# Patient Record
Sex: FEMALE | Race: WHITE | Employment: OTHER | URBAN - METROPOLITAN AREA
[De-identification: names, ages, dates, MRNs, and addresses within clinical notes are randomized per-mention and may not be internally consistent; named-entity substitution may affect disease eponyms.]

---

## 2018-10-17 ENCOUNTER — APPOINTMENT (OUTPATIENT)
Dept: CT IMAGING | Age: 78
DRG: 070 | End: 2018-10-17
Payer: MEDICARE

## 2018-10-17 ENCOUNTER — APPOINTMENT (OUTPATIENT)
Dept: GENERAL RADIOLOGY | Age: 78
DRG: 070 | End: 2018-10-17
Payer: MEDICARE

## 2018-10-17 ENCOUNTER — HOSPITAL ENCOUNTER (INPATIENT)
Age: 78
LOS: 5 days | Discharge: HOME HEALTH | DRG: 070 | End: 2018-10-22
Attending: EMERGENCY MEDICINE | Admitting: INTERNAL MEDICINE
Payer: MEDICARE

## 2018-10-17 DIAGNOSIS — A41.9 SEPSIS SECONDARY TO UTI (HCC): ICD-10-CM

## 2018-10-17 DIAGNOSIS — N39.0 SEPSIS SECONDARY TO UTI (HCC): ICD-10-CM

## 2018-10-17 DIAGNOSIS — J96.01 ACUTE RESPIRATORY FAILURE WITH HYPOXIA (HCC): Primary | ICD-10-CM

## 2018-10-17 DIAGNOSIS — E08.10 DIABETIC KETOACIDOSIS WITHOUT COMA ASSOCIATED WITH DIABETES MELLITUS DUE TO UNDERLYING CONDITION (HCC): ICD-10-CM

## 2018-10-17 PROBLEM — E13.10 SECONDARY DM WITH DKA, UNCONTROLLED (HCC): Status: RESOLVED | Noted: 2018-10-17 | Resolved: 2018-10-17

## 2018-10-17 PROBLEM — E11.10 DKA (DIABETIC KETOACIDOSIS) (HCC): Status: ACTIVE | Noted: 2018-10-17

## 2018-10-17 PROBLEM — E66.01 MORBID OBESITY WITH BMI OF 60.0-69.9, ADULT (HCC): Chronic | Status: ACTIVE | Noted: 2018-10-17

## 2018-10-17 PROBLEM — E13.10 SECONDARY DM WITH DKA, UNCONTROLLED (HCC): Status: ACTIVE | Noted: 2018-10-17

## 2018-10-17 PROBLEM — G93.41 METABOLIC ENCEPHALOPATHY: Status: ACTIVE | Noted: 2018-10-17

## 2018-10-17 LAB
ALBUMIN SERPL-MCNC: 3.8 G/DL (ref 3.5–5.2)
ALP BLD-CCNC: 77 U/L (ref 35–104)
ALT SERPL-CCNC: 24 U/L (ref 0–32)
AMMONIA: 27 UMOL/L (ref 11–51)
AMORPHOUS: ABNORMAL
ANION GAP SERPL CALCULATED.3IONS-SCNC: 15 MMOL/L (ref 7–16)
ANION GAP SERPL CALCULATED.3IONS-SCNC: 22 MMOL/L (ref 7–16)
AST SERPL-CCNC: 36 U/L (ref 0–31)
BACTERIA: ABNORMAL /HPF
BETA-HYDROXYBUTYRATE: 0.51 MMOL/L (ref 0.02–0.27)
BILIRUB SERPL-MCNC: 0.2 MG/DL (ref 0–1.2)
BILIRUBIN URINE: NEGATIVE
BLOOD, URINE: ABNORMAL
BUN BLDV-MCNC: 37 MG/DL (ref 8–23)
BUN BLDV-MCNC: 42 MG/DL (ref 8–23)
CALCIUM SERPL-MCNC: 9.2 MG/DL (ref 8.6–10.2)
CALCIUM SERPL-MCNC: 9.7 MG/DL (ref 8.6–10.2)
CHLORIDE BLD-SCNC: 93 MMOL/L (ref 98–107)
CHLORIDE BLD-SCNC: 94 MMOL/L (ref 98–107)
CHP ED QC CHECK: YES
CLARITY: ABNORMAL
CO2: 24 MMOL/L (ref 22–29)
CO2: 29 MMOL/L (ref 22–29)
COLOR: YELLOW
CREAT SERPL-MCNC: 1.5 MG/DL (ref 0.5–1)
CREAT SERPL-MCNC: 1.6 MG/DL (ref 0.5–1)
EKG ATRIAL RATE: 105 BPM
EKG P AXIS: 49 DEGREES
EKG P-R INTERVAL: 202 MS
EKG Q-T INTERVAL: 352 MS
EKG QRS DURATION: 114 MS
EKG QTC CALCULATION (BAZETT): 465 MS
EKG R AXIS: -63 DEGREES
EKG T AXIS: 106 DEGREES
EKG VENTRICULAR RATE: 105 BPM
GFR AFRICAN AMERICAN: 31
GFR AFRICAN AMERICAN: 31
GFR AFRICAN AMERICAN: 38
GFR AFRICAN AMERICAN: 41
GFR NON-AFRICAN AMERICAN: 26 ML/MIN/1.73
GFR NON-AFRICAN AMERICAN: 26 ML/MIN/1.73
GFR NON-AFRICAN AMERICAN: 31 ML/MIN/1.73
GFR NON-AFRICAN AMERICAN: 34 ML/MIN/1.73
GLUCOSE BLD-MCNC: 202 MG/DL
GLUCOSE BLD-MCNC: 210 MG/DL (ref 74–109)
GLUCOSE BLD-MCNC: 216 MG/DL (ref 74–109)
GLUCOSE BLD-MCNC: 320 MG/DL (ref 74–109)
GLUCOSE BLD-MCNC: 322 MG/DL (ref 74–109)
GLUCOSE URINE: NEGATIVE MG/DL
HCT VFR BLD CALC: 43.6 % (ref 34–48)
HEMOGLOBIN: 13 G/DL (ref 11.5–15.5)
INR BLD: 3.4
KETONES, URINE: NEGATIVE MG/DL
LACTIC ACID: 2.6 MMOL/L (ref 0.5–2.2)
LACTIC ACID: 6.3 MMOL/L (ref 0.5–2.2)
LEUKOCYTE ESTERASE, URINE: ABNORMAL
MCH RBC QN AUTO: 26.4 PG (ref 26–35)
MCHC RBC AUTO-ENTMCNC: 29.8 % (ref 32–34.5)
MCV RBC AUTO: 88.4 FL (ref 80–99.9)
METER GLUCOSE: 202 MG/DL (ref 70–110)
NITRITE, URINE: NEGATIVE
PDW BLD-RTO: 15.1 FL (ref 11.5–15)
PH UA: >=9 (ref 5–9)
PH VENOUS: 7.21 (ref 7.3–7.42)
PLATELET # BLD: 347 E9/L (ref 130–450)
PMV BLD AUTO: 10.5 FL (ref 7–12)
POC CHLORIDE: 99 MMOL/L (ref 100–108)
POC CHLORIDE: 99 MMOL/L (ref 100–108)
POC CREATININE: 1.9 MG/DL (ref 0.5–1)
POC CREATININE: 1.9 MG/DL (ref 0.5–1)
POC POTASSIUM: 3.3 MMOL/L (ref 3.5–5)
POC POTASSIUM: 3.4 MMOL/L (ref 3.5–5)
POC SODIUM: 138 MMOL/L (ref 132–146)
POC SODIUM: 138 MMOL/L (ref 132–146)
POTASSIUM SERPL-SCNC: 3.9 MMOL/L (ref 3.5–5)
POTASSIUM SERPL-SCNC: 4.4 MMOL/L (ref 3.5–5)
PROTEIN UA: 100 MG/DL
PROTHROMBIN TIME: 37.6 SEC (ref 9.3–12.4)
RBC # BLD: 4.93 E12/L (ref 3.5–5.5)
RBC UA: ABNORMAL /HPF (ref 0–2)
SODIUM BLD-SCNC: 138 MMOL/L (ref 132–146)
SODIUM BLD-SCNC: 139 MMOL/L (ref 132–146)
SPECIFIC GRAVITY UA: 1.01 (ref 1–1.03)
TOTAL CK: 314 U/L (ref 20–180)
TOTAL PROTEIN: 7.1 G/DL (ref 6.4–8.3)
TROPONIN: 0.02 NG/ML (ref 0–0.03)
UROBILINOGEN, URINE: 0.2 E.U./DL
WBC # BLD: 22.4 E9/L (ref 4.5–11.5)
WBC UA: ABNORMAL /HPF (ref 0–5)

## 2018-10-17 PROCEDURE — 87088 URINE BACTERIA CULTURE: CPT

## 2018-10-17 PROCEDURE — 84132 ASSAY OF SERUM POTASSIUM: CPT

## 2018-10-17 PROCEDURE — 82140 ASSAY OF AMMONIA: CPT

## 2018-10-17 PROCEDURE — 82800 BLOOD PH: CPT

## 2018-10-17 PROCEDURE — 96365 THER/PROPH/DIAG IV INF INIT: CPT

## 2018-10-17 PROCEDURE — 84484 ASSAY OF TROPONIN QUANT: CPT

## 2018-10-17 PROCEDURE — 83605 ASSAY OF LACTIC ACID: CPT

## 2018-10-17 PROCEDURE — 82947 ASSAY GLUCOSE BLOOD QUANT: CPT

## 2018-10-17 PROCEDURE — 71045 X-RAY EXAM CHEST 1 VIEW: CPT

## 2018-10-17 PROCEDURE — 93005 ELECTROCARDIOGRAM TRACING: CPT | Performed by: EMERGENCY MEDICINE

## 2018-10-17 PROCEDURE — 36415 COLL VENOUS BLD VENIPUNCTURE: CPT

## 2018-10-17 PROCEDURE — 2580000003 HC RX 258: Performed by: EMERGENCY MEDICINE

## 2018-10-17 PROCEDURE — 82435 ASSAY OF BLOOD CHLORIDE: CPT

## 2018-10-17 PROCEDURE — 82962 GLUCOSE BLOOD TEST: CPT

## 2018-10-17 PROCEDURE — 82010 KETONE BODYS QUAN: CPT

## 2018-10-17 PROCEDURE — 84295 ASSAY OF SERUM SODIUM: CPT

## 2018-10-17 PROCEDURE — 70450 CT HEAD/BRAIN W/O DYE: CPT

## 2018-10-17 PROCEDURE — 85610 PROTHROMBIN TIME: CPT

## 2018-10-17 PROCEDURE — 96367 TX/PROPH/DG ADDL SEQ IV INF: CPT

## 2018-10-17 PROCEDURE — 80053 COMPREHEN METABOLIC PANEL: CPT

## 2018-10-17 PROCEDURE — 80048 BASIC METABOLIC PNL TOTAL CA: CPT

## 2018-10-17 PROCEDURE — 82565 ASSAY OF CREATININE: CPT

## 2018-10-17 PROCEDURE — 99285 EMERGENCY DEPT VISIT HI MDM: CPT

## 2018-10-17 PROCEDURE — 6360000002 HC RX W HCPCS: Performed by: EMERGENCY MEDICINE

## 2018-10-17 PROCEDURE — 81001 URINALYSIS AUTO W/SCOPE: CPT

## 2018-10-17 PROCEDURE — 87040 BLOOD CULTURE FOR BACTERIA: CPT

## 2018-10-17 PROCEDURE — 2000000000 HC ICU R&B

## 2018-10-17 PROCEDURE — 85027 COMPLETE CBC AUTOMATED: CPT

## 2018-10-17 PROCEDURE — 82550 ASSAY OF CK (CPK): CPT

## 2018-10-17 RX ORDER — DEXTROSE MONOHYDRATE 25 G/50ML
12.5 INJECTION, SOLUTION INTRAVENOUS PRN
Status: DISCONTINUED | OUTPATIENT
Start: 2018-10-17 | End: 2018-10-22 | Stop reason: HOSPADM

## 2018-10-17 RX ORDER — DEXTROSE MONOHYDRATE 50 MG/ML
100 INJECTION, SOLUTION INTRAVENOUS PRN
Status: DISCONTINUED | OUTPATIENT
Start: 2018-10-17 | End: 2018-10-18

## 2018-10-17 RX ORDER — NICOTINE POLACRILEX 4 MG
15 LOZENGE BUCCAL PRN
Status: DISCONTINUED | OUTPATIENT
Start: 2018-10-17 | End: 2018-10-22 | Stop reason: HOSPADM

## 2018-10-17 RX ORDER — 0.9 % SODIUM CHLORIDE 0.9 %
1000 INTRAVENOUS SOLUTION INTRAVENOUS ONCE
Status: COMPLETED | OUTPATIENT
Start: 2018-10-17 | End: 2018-10-18

## 2018-10-17 RX ADMIN — SODIUM CHLORIDE 1000 ML: 9 INJECTION, SOLUTION INTRAVENOUS at 21:36

## 2018-10-17 RX ADMIN — CEFEPIME HYDROCHLORIDE 2 G: 2 INJECTION, POWDER, FOR SOLUTION INTRAVENOUS at 21:37

## 2018-10-17 RX ADMIN — VANCOMYCIN HYDROCHLORIDE 2500 MG: 1 INJECTION, POWDER, LYOPHILIZED, FOR SOLUTION INTRAVENOUS at 22:25

## 2018-10-18 ENCOUNTER — APPOINTMENT (OUTPATIENT)
Dept: GENERAL RADIOLOGY | Age: 78
DRG: 070 | End: 2018-10-18
Payer: MEDICARE

## 2018-10-18 PROBLEM — J96.00 ACUTE RESPIRATORY FAILURE (HCC): Status: ACTIVE | Noted: 2018-10-17

## 2018-10-18 LAB
ALBUMIN SERPL-MCNC: 3.5 G/DL (ref 3.5–5.2)
ALP BLD-CCNC: 76 U/L (ref 35–104)
ALT SERPL-CCNC: 35 U/L (ref 0–32)
ANION GAP SERPL CALCULATED.3IONS-SCNC: 14 MMOL/L (ref 7–16)
ANION GAP SERPL CALCULATED.3IONS-SCNC: 14 MMOL/L (ref 7–16)
AST SERPL-CCNC: 52 U/L (ref 0–31)
B.E.: 1.7 MMOL/L (ref -3–3)
B.E.: 2.3 MMOL/L (ref -3–3)
BASOPHILS ABSOLUTE: 0.03 E9/L (ref 0–0.2)
BASOPHILS RELATIVE PERCENT: 0.2 % (ref 0–2)
BILIRUB SERPL-MCNC: 0.2 MG/DL (ref 0–1.2)
BUN BLDV-MCNC: 45 MG/DL (ref 8–23)
BUN BLDV-MCNC: 50 MG/DL (ref 8–23)
CALCIUM SERPL-MCNC: 8.9 MG/DL (ref 8.6–10.2)
CALCIUM SERPL-MCNC: 8.9 MG/DL (ref 8.6–10.2)
CHLORIDE BLD-SCNC: 94 MMOL/L (ref 98–107)
CHLORIDE BLD-SCNC: 95 MMOL/L (ref 98–107)
CK MB: 7.3 NG/ML (ref 0–4.3)
CK MB: 9.3 NG/ML (ref 0–4.3)
CO2: 26 MMOL/L (ref 22–29)
CO2: 30 MMOL/L (ref 22–29)
COHB: 0.6 % (ref 0–1.5)
COHB: 0.6 % (ref 0–1.5)
CREAT SERPL-MCNC: 1.7 MG/DL (ref 0.5–1)
CREAT SERPL-MCNC: 1.8 MG/DL (ref 0.5–1)
CRITICAL: ABNORMAL
CRITICAL: ABNORMAL
DATE ANALYZED: ABNORMAL
DATE ANALYZED: ABNORMAL
DATE OF COLLECTION: ABNORMAL
DATE OF COLLECTION: ABNORMAL
EOSINOPHILS ABSOLUTE: 0 E9/L (ref 0.05–0.5)
EOSINOPHILS RELATIVE PERCENT: 0 % (ref 0–6)
FILM ARRAY ADENOVIRUS: NORMAL
FILM ARRAY BORDETELLA PERTUSSIS: NORMAL
FILM ARRAY CHLAMYDOPHILIA PNEUMONIAE: NORMAL
FILM ARRAY CORONAVIRUS 229E: NORMAL
FILM ARRAY CORONAVIRUS HKU1: NORMAL
FILM ARRAY CORONAVIRUS NL63: NORMAL
FILM ARRAY CORONAVIRUS OC43: NORMAL
FILM ARRAY INFLUENZA A VIRUS 09H1: NORMAL
FILM ARRAY INFLUENZA A VIRUS H1: NORMAL
FILM ARRAY INFLUENZA A VIRUS H3: NORMAL
FILM ARRAY INFLUENZA A VIRUS: NORMAL
FILM ARRAY INFLUENZA B: NORMAL
FILM ARRAY METAPNEUMOVIRUS: NORMAL
FILM ARRAY MYCOPLASMA PNEUMONIAE: NORMAL
FILM ARRAY PARAINFLUENZA VIRUS 1: NORMAL
FILM ARRAY PARAINFLUENZA VIRUS 2: NORMAL
FILM ARRAY PARAINFLUENZA VIRUS 3: NORMAL
FILM ARRAY PARAINFLUENZA VIRUS 4: NORMAL
FILM ARRAY RESPIRATORY SYNCITIAL VIRUS: NORMAL
FILM ARRAY RHINOVIRUS/ENTEROVIRUS: NORMAL
GFR AFRICAN AMERICAN: 33
GFR AFRICAN AMERICAN: 35
GFR NON-AFRICAN AMERICAN: 27 ML/MIN/1.73
GFR NON-AFRICAN AMERICAN: 29 ML/MIN/1.73
GLUCOSE BLD-MCNC: 192 MG/DL (ref 74–109)
GLUCOSE BLD-MCNC: 217 MG/DL (ref 74–109)
HBA1C MFR BLD: 7 % (ref 4–5.6)
HCO3: 27 MMOL/L (ref 22–26)
HCO3: 29.3 MMOL/L (ref 22–26)
HCT VFR BLD CALC: 41.2 % (ref 34–48)
HEMOGLOBIN: 12.4 G/DL (ref 11.5–15.5)
HHB: 3.3 % (ref 0–5)
HHB: 5.7 % (ref 0–5)
IMMATURE GRANULOCYTES #: 0.1 E9/L
IMMATURE GRANULOCYTES %: 0.5 % (ref 0–5)
INR BLD: 3.5
LAB: 9558
LAB: 9558
LACTIC ACID: 1.4 MMOL/L (ref 0.5–2.2)
LACTIC ACID: 2.6 MMOL/L (ref 0.5–2.2)
LACTIC ACID: 2.8 MMOL/L (ref 0.5–2.2)
LYMPHOCYTES ABSOLUTE: 0.6 E9/L (ref 1.5–4)
LYMPHOCYTES RELATIVE PERCENT: 3.3 % (ref 20–42)
Lab: ABNORMAL
Lab: ABNORMAL
MCH RBC QN AUTO: 26.4 PG (ref 26–35)
MCHC RBC AUTO-ENTMCNC: 30.1 % (ref 32–34.5)
MCV RBC AUTO: 87.7 FL (ref 80–99.9)
METER GLUCOSE: 157 MG/DL (ref 70–110)
METER GLUCOSE: 164 MG/DL (ref 70–110)
METER GLUCOSE: 184 MG/DL (ref 70–110)
METER GLUCOSE: 198 MG/DL (ref 70–110)
METHB: 0 % (ref 0–1.5)
METHB: 0.1 % (ref 0–1.5)
MODE: ABNORMAL
MODE: ABNORMAL
MONOCYTES ABSOLUTE: 1.2 E9/L (ref 0.1–0.95)
MONOCYTES RELATIVE PERCENT: 6.5 % (ref 2–12)
NEUTROPHILS ABSOLUTE: 16.52 E9/L (ref 1.8–7.3)
NEUTROPHILS RELATIVE PERCENT: 89.5 % (ref 43–80)
O2 CONTENT: 16.6 ML/DL
O2 CONTENT: 17.4 ML/DL
O2 SATURATION: 94.3 % (ref 92–98.5)
O2 SATURATION: 96.7 % (ref 92–98.5)
O2HB: 93.7 % (ref 94–97)
O2HB: 96 % (ref 94–97)
OPERATOR ID: 7490
OPERATOR ID: ABNORMAL
PATIENT TEMP: 37 C
PATIENT TEMP: 37 C
PCO2: 45.5 MMHG (ref 35–45)
PCO2: 55.8 MMHG (ref 35–45)
PDW BLD-RTO: 15.3 FL (ref 11.5–15)
PH BLOOD GAS: 7.34 (ref 7.35–7.45)
PH BLOOD GAS: 7.39 (ref 7.35–7.45)
PLATELET # BLD: 285 E9/L (ref 130–450)
PMV BLD AUTO: 10.7 FL (ref 7–12)
PO2: 73.9 MMHG (ref 60–100)
PO2: 86.6 MMHG (ref 60–100)
POTASSIUM SERPL-SCNC: 3.6 MMOL/L (ref 3.5–5)
POTASSIUM SERPL-SCNC: 4.1 MMOL/L (ref 3.5–5)
PRO-BNP: 314 PG/ML (ref 0–450)
PROCALCITONIN: 0.5 NG/ML (ref 0–0.08)
PROTHROMBIN TIME: 39.5 SEC (ref 9.3–12.4)
RBC # BLD: 4.7 E12/L (ref 3.5–5.5)
SODIUM BLD-SCNC: 135 MMOL/L (ref 132–146)
SODIUM BLD-SCNC: 138 MMOL/L (ref 132–146)
SOURCE, BLOOD GAS: ABNORMAL
SOURCE, BLOOD GAS: ABNORMAL
THB: 12.2 G/DL (ref 11.5–16.5)
THB: 13.2 G/DL (ref 11.5–16.5)
TIME ANALYZED: 1932
TIME ANALYZED: 928
TOTAL CK: 1615 U/L (ref 20–180)
TOTAL CK: 1624 U/L (ref 20–180)
TOTAL PROTEIN: 6.3 G/DL (ref 6.4–8.3)
TROPONIN: 0.04 NG/ML (ref 0–0.03)
WBC # BLD: 18.5 E9/L (ref 4.5–11.5)

## 2018-10-18 PROCEDURE — 87450 HC DIRECT STREP B ANTIGEN: CPT

## 2018-10-18 PROCEDURE — 87581 M.PNEUMON DNA AMP PROBE: CPT

## 2018-10-18 PROCEDURE — 6370000000 HC RX 637 (ALT 250 FOR IP): Performed by: INTERNAL MEDICINE

## 2018-10-18 PROCEDURE — 84145 PROCALCITONIN (PCT): CPT

## 2018-10-18 PROCEDURE — 73620 X-RAY EXAM OF FOOT: CPT

## 2018-10-18 PROCEDURE — 80053 COMPREHEN METABOLIC PANEL: CPT

## 2018-10-18 PROCEDURE — 80048 BASIC METABOLIC PNL TOTAL CA: CPT

## 2018-10-18 PROCEDURE — 94762 N-INVAS EAR/PLS OXIMTRY CONT: CPT

## 2018-10-18 PROCEDURE — 87081 CULTURE SCREEN ONLY: CPT

## 2018-10-18 PROCEDURE — 85025 COMPLETE CBC W/AUTO DIFF WBC: CPT

## 2018-10-18 PROCEDURE — 87486 CHLMYD PNEUM DNA AMP PROBE: CPT

## 2018-10-18 PROCEDURE — 87798 DETECT AGENT NOS DNA AMP: CPT

## 2018-10-18 PROCEDURE — 82550 ASSAY OF CK (CPK): CPT

## 2018-10-18 PROCEDURE — 82962 GLUCOSE BLOOD TEST: CPT

## 2018-10-18 PROCEDURE — 84484 ASSAY OF TROPONIN QUANT: CPT

## 2018-10-18 PROCEDURE — 82805 BLOOD GASES W/O2 SATURATION: CPT

## 2018-10-18 PROCEDURE — 94660 CPAP INITIATION&MGMT: CPT

## 2018-10-18 PROCEDURE — 2580000003 HC RX 258: Performed by: CLINICAL NURSE SPECIALIST

## 2018-10-18 PROCEDURE — 83880 ASSAY OF NATRIURETIC PEPTIDE: CPT

## 2018-10-18 PROCEDURE — 2580000003 HC RX 258: Performed by: EMERGENCY MEDICINE

## 2018-10-18 PROCEDURE — 83036 HEMOGLOBIN GLYCOSYLATED A1C: CPT

## 2018-10-18 PROCEDURE — 82553 CREATINE MB FRACTION: CPT

## 2018-10-18 PROCEDURE — 85610 PROTHROMBIN TIME: CPT

## 2018-10-18 PROCEDURE — 36600 WITHDRAWAL OF ARTERIAL BLOOD: CPT

## 2018-10-18 PROCEDURE — 99222 1ST HOSP IP/OBS MODERATE 55: CPT | Performed by: SURGERY

## 2018-10-18 PROCEDURE — 6370000000 HC RX 637 (ALT 250 FOR IP): Performed by: STUDENT IN AN ORGANIZED HEALTH CARE EDUCATION/TRAINING PROGRAM

## 2018-10-18 PROCEDURE — 2580000003 HC RX 258: Performed by: INTERNAL MEDICINE

## 2018-10-18 PROCEDURE — 6360000002 HC RX W HCPCS: Performed by: CLINICAL NURSE SPECIALIST

## 2018-10-18 PROCEDURE — 2700000000 HC OXYGEN THERAPY PER DAY

## 2018-10-18 PROCEDURE — 83605 ASSAY OF LACTIC ACID: CPT

## 2018-10-18 PROCEDURE — 94640 AIRWAY INHALATION TREATMENT: CPT

## 2018-10-18 PROCEDURE — 1200000000 HC SEMI PRIVATE

## 2018-10-18 PROCEDURE — 36415 COLL VENOUS BLD VENIPUNCTURE: CPT

## 2018-10-18 PROCEDURE — 87633 RESP VIRUS 12-25 TARGETS: CPT

## 2018-10-18 RX ORDER — INSULIN GLARGINE 100 [IU]/ML
76 INJECTION, SOLUTION SUBCUTANEOUS NIGHTLY
Status: DISCONTINUED | OUTPATIENT
Start: 2018-10-18 | End: 2018-10-18

## 2018-10-18 RX ORDER — IPRATROPIUM BROMIDE AND ALBUTEROL SULFATE 2.5; .5 MG/3ML; MG/3ML
1 SOLUTION RESPIRATORY (INHALATION) 4 TIMES DAILY
Status: DISCONTINUED | OUTPATIENT
Start: 2018-10-18 | End: 2018-10-22 | Stop reason: HOSPADM

## 2018-10-18 RX ORDER — WARFARIN SODIUM 5 MG/1
5 TABLET ORAL DAILY
Status: DISCONTINUED | OUTPATIENT
Start: 2018-10-19 | End: 2018-10-18

## 2018-10-18 RX ORDER — INSULIN GLARGINE 100 [IU]/ML
20 INJECTION, SOLUTION SUBCUTANEOUS NIGHTLY
Status: DISCONTINUED | OUTPATIENT
Start: 2018-10-18 | End: 2018-10-22 | Stop reason: HOSPADM

## 2018-10-18 RX ORDER — NYSTATIN 100000 U/G
OINTMENT TOPICAL 2 TIMES DAILY
Status: DISCONTINUED | OUTPATIENT
Start: 2018-10-18 | End: 2018-10-22 | Stop reason: HOSPADM

## 2018-10-18 RX ORDER — SODIUM CHLORIDE 0.9 % (FLUSH) 0.9 %
10 SYRINGE (ML) INJECTION EVERY 12 HOURS SCHEDULED
Status: DISCONTINUED | OUTPATIENT
Start: 2018-10-18 | End: 2018-10-22 | Stop reason: HOSPADM

## 2018-10-18 RX ORDER — NICOTINE POLACRILEX 4 MG
15 LOZENGE BUCCAL PRN
Status: DISCONTINUED | OUTPATIENT
Start: 2018-10-18 | End: 2018-10-18 | Stop reason: SDUPTHER

## 2018-10-18 RX ORDER — DEXTROSE MONOHYDRATE 25 G/50ML
12.5 INJECTION, SOLUTION INTRAVENOUS PRN
Status: DISCONTINUED | OUTPATIENT
Start: 2018-10-18 | End: 2018-10-18 | Stop reason: SDUPTHER

## 2018-10-18 RX ORDER — WARFARIN SODIUM 5 MG/1
5 TABLET ORAL DAILY
Status: DISCONTINUED | OUTPATIENT
Start: 2018-10-18 | End: 2018-10-18

## 2018-10-18 RX ORDER — SODIUM CHLORIDE 0.9 % (FLUSH) 0.9 %
10 SYRINGE (ML) INJECTION PRN
Status: DISCONTINUED | OUTPATIENT
Start: 2018-10-18 | End: 2018-10-22 | Stop reason: HOSPADM

## 2018-10-18 RX ORDER — ONDANSETRON 2 MG/ML
4 INJECTION INTRAMUSCULAR; INTRAVENOUS EVERY 6 HOURS PRN
Status: DISCONTINUED | OUTPATIENT
Start: 2018-10-18 | End: 2018-10-22 | Stop reason: HOSPADM

## 2018-10-18 RX ORDER — GABAPENTIN 300 MG/1
600 CAPSULE ORAL 2 TIMES DAILY
Status: DISCONTINUED | OUTPATIENT
Start: 2018-10-18 | End: 2018-10-22 | Stop reason: HOSPADM

## 2018-10-18 RX ORDER — LEVOTHYROXINE SODIUM 0.12 MG/1
125 TABLET ORAL DAILY
Status: DISCONTINUED | OUTPATIENT
Start: 2018-10-18 | End: 2018-10-22 | Stop reason: HOSPADM

## 2018-10-18 RX ORDER — SODIUM CHLORIDE 9 MG/ML
INJECTION, SOLUTION INTRAVENOUS CONTINUOUS
Status: DISCONTINUED | OUTPATIENT
Start: 2018-10-18 | End: 2018-10-21

## 2018-10-18 RX ORDER — PANTOPRAZOLE SODIUM 40 MG/1
40 TABLET, DELAYED RELEASE ORAL DAILY
Status: DISCONTINUED | OUTPATIENT
Start: 2018-10-18 | End: 2018-10-22 | Stop reason: HOSPADM

## 2018-10-18 RX ORDER — DEXTROSE MONOHYDRATE 50 MG/ML
100 INJECTION, SOLUTION INTRAVENOUS PRN
Status: DISCONTINUED | OUTPATIENT
Start: 2018-10-18 | End: 2018-10-22 | Stop reason: HOSPADM

## 2018-10-18 RX ORDER — ALLOPURINOL 100 MG/1
100 TABLET ORAL DAILY
Status: DISCONTINUED | OUTPATIENT
Start: 2018-10-18 | End: 2018-10-18

## 2018-10-18 RX ADMIN — IPRATROPIUM BROMIDE AND ALBUTEROL SULFATE 1 AMPULE: .5; 3 SOLUTION RESPIRATORY (INHALATION) at 08:48

## 2018-10-18 RX ADMIN — Medication 10 ML: at 20:47

## 2018-10-18 RX ADMIN — NYSTATIN: 100000 OINTMENT TOPICAL at 08:01

## 2018-10-18 RX ADMIN — SODIUM CHLORIDE 1000 ML: 9 INJECTION, SOLUTION INTRAVENOUS at 01:00

## 2018-10-18 RX ADMIN — IPRATROPIUM BROMIDE AND ALBUTEROL SULFATE 1 AMPULE: .5; 3 SOLUTION RESPIRATORY (INHALATION) at 16:37

## 2018-10-18 RX ADMIN — NYSTATIN: 100000 OINTMENT TOPICAL at 20:48

## 2018-10-18 RX ADMIN — Medication 10 ML: at 08:08

## 2018-10-18 RX ADMIN — CEFTRIAXONE SODIUM 1 G: 1 INJECTION, POWDER, FOR SOLUTION INTRAMUSCULAR; INTRAVENOUS at 20:48

## 2018-10-18 RX ADMIN — INSULIN LISPRO 3 UNITS: 100 INJECTION, SOLUTION INTRAVENOUS; SUBCUTANEOUS at 08:01

## 2018-10-18 RX ADMIN — IPRATROPIUM BROMIDE AND ALBUTEROL SULFATE 1 AMPULE: .5; 3 SOLUTION RESPIRATORY (INHALATION) at 20:41

## 2018-10-18 RX ADMIN — PANTOPRAZOLE SODIUM 40 MG: 40 TABLET, DELAYED RELEASE ORAL at 08:01

## 2018-10-18 RX ADMIN — GABAPENTIN 600 MG: 300 CAPSULE ORAL at 20:47

## 2018-10-18 RX ADMIN — INSULIN GLARGINE 20 UNITS: 100 INJECTION, SOLUTION SUBCUTANEOUS at 21:01

## 2018-10-18 RX ADMIN — LEVOTHYROXINE SODIUM 125 MCG: 125 TABLET ORAL at 08:01

## 2018-10-18 RX ADMIN — AZITHROMYCIN MONOHYDRATE 500 MG: 500 INJECTION, POWDER, LYOPHILIZED, FOR SOLUTION INTRAVENOUS at 20:47

## 2018-10-18 RX ADMIN — SODIUM CHLORIDE: 9 INJECTION, SOLUTION INTRAVENOUS at 22:06

## 2018-10-18 RX ADMIN — INSULIN LISPRO 2 UNITS: 100 INJECTION, SOLUTION INTRAVENOUS; SUBCUTANEOUS at 21:00

## 2018-10-18 RX ADMIN — GABAPENTIN 600 MG: 300 CAPSULE ORAL at 09:36

## 2018-10-18 RX ADMIN — IPRATROPIUM BROMIDE AND ALBUTEROL SULFATE 1 AMPULE: .5; 3 SOLUTION RESPIRATORY (INHALATION) at 12:24

## 2018-10-18 ASSESSMENT — PAIN SCALES - GENERAL
PAINLEVEL_OUTOF10: 0

## 2018-10-18 ASSESSMENT — SLEEP AND FATIGUE QUESTIONNAIRES
DIFFICULTY ARISING: NO
DO YOU USE A SLEEP AID: NO
RESTFUL SLEEP: YES
AVERAGE NUMBER OF SLEEP HOURS: 5
DIFFICULTY FALLING ASLEEP: NO
SLEEP PATTERN: NORMAL
DO YOU HAVE DIFFICULTY SLEEPING: YES
DIFFICULTY STAYING ASLEEP: YES

## 2018-10-18 NOTE — PROGRESS NOTES
[facility] Quality Flow/Interdisciplinary Rounds Progress Note        Quality Flow Rounds held on October 18, 2018    Disciplines Attending:  Bedside Nurse, Charge nurse, IVAN, OT, PT, , and . Sherril Opitz was admitted on 10/17/2018  6:54 PM    Anticipated Discharge Date:  Expected Discharge Date: 10/25/18    Disposition:    Cosmo Score:  Cosmo Scale Score: 12    Readmission Risk              Risk of Unplanned Readmission:        13             Discussed patient goal for the day, patient clinical progression, and barriers to discharge.   The following Goal(s) of the Day/Commitment(s) have been identified:  Transfer patient       Rosa Conner  October 18, 2018

## 2018-10-18 NOTE — ED NOTES
Heads up report called to micu, will continue with bedside once room is ready     Joseph Hagan RN  10/18/18 7220

## 2018-10-18 NOTE — CONSULTS
effusion with associated left basilar   airspace opacity. Recommend clinical correlation. CT of head negative for any acute process       Echo:  10/16/2016 EF 65-70%     Labs:  Lab Results   Component Value Date    WBC 18.5 10/18/2018    HGB 12.4 10/18/2018    HCT 41.2 10/18/2018    MCV 87.7 10/18/2018    MCH 26.4 10/18/2018    MCHC 30.1 10/18/2018    RDW 15.3 10/18/2018     10/18/2018    MPV 10.7 10/18/2018     Lab Results   Component Value Date     10/18/2018    K 3.6 10/18/2018    CL 94 10/18/2018    CO2 30 10/18/2018    BUN 45 10/18/2018    CREATININE 1.7 10/18/2018    LABALBU 3.5 10/18/2018    LABALBU 2.9 03/31/2011    CALCIUM 8.9 10/18/2018    GFRAA 35 10/18/2018    LABGLOM 29 10/18/2018     Lab Results   Component Value Date    PROTIME 39.5 10/18/2018    PROTIME 22.5 09/14/2016    INR 3.5 10/18/2018     Recent Labs      10/18/18   0520   PROBNP  314     Recent Labs      10/17/18   1909  10/18/18   0520   TROPONINI  0.02  0.04*     Recent Labs      10/18/18   0520   PROCAL  0.50*     This SmartLink has not been configured with any valid records. Results for Adama Rodriguez (MRN [id number]) as of 10/18/2018 17:12   Ref. Range 10/18/2018 09:28   Source: Unknown Blood Arterial   pH, Blood Gas Latest Ref Range: 7.350 - 7.450  7.338 (L)   PCO2 Latest Ref Range: 35.0 - 45.0 mmHg 55.8 (H)   pO2 Latest Ref Range: 60.0 - 100.0 mmHg 73.9   HCO3 Latest Ref Range: 22.0 - 26.0 mmol/L 29.3 (H)   Base Excess Latest Ref Range: -3.0 - 3.0 mmol/L 2.3   O2 Sat Latest Ref Range: 92.0 - 98.5 % 94.3   tHb (est) Latest Ref Range: 11.5 - 16.5 g/dL 13.2   O2Hb Latest Ref Range: 94.0 - 97.0 % 93.7 (L)   COHb Latest Ref Range: 0.0 - 1.5 % 0.6   MetHb Latest Ref Range: 0.0 - 1.5 % 0.0   HHb Latest Ref Range: 0.0 - 5.0 % 5.7 (H)   O2 Content Latest Units: mL/dL 17.4   Pt Temp Latest Units: C 37.0   Critical(s) Notified Unknown .  No Critical Values   Time Analyzed Unknown 0928   Mode Unknown NC-4 L

## 2018-10-18 NOTE — ED NOTES
Dr. Abdiaziz Molina advised of patients , insulin drip HELD at this time.       Gemma Nixon RN  10/17/18 [id number]

## 2018-10-18 NOTE — H&P
SALPINGO-OOPHORECTOMY      TONSILLECTOMY      URETER STENT PLACEMENT Bilateral 2010    has had this done twice         Medications Prior to Admission:    Prescriptions Prior to Admission: levothyroxine (SYNTHROID) 125 MCG tablet, Take 125 mcg by mouth Daily  Ergocalciferol (VITAMIN D2 PO), Take 50,000 Units by mouth once a week  allopurinol (ZYLOPRIM) 100 MG tablet, TAKE 1 TABLET BY MOUTH DAILY  bumetanide (BUMEX) 1 MG tablet, TAKE 1 TABLET BY MOUTH TWICE DAILY  traMADol-acetaminophen (ULTRACET) 37.5-325 MG per tablet, Take 1 tablet by mouth every 6 hours (Patient taking differently: Take 1 tablet by mouth every 6 hours as needed )  sucralfate (CARAFATE) 1 GM tablet, Take 1 tablet by mouth 4 times daily  potassium citrate (UROCIT-K) 10 MEQ (1080 MG) extended release tablet, Take 1 tablet by mouth daily  Bisacodyl (DULCOLAX PO), Take  by mouth daily. Cranberry-Vitamin C-Probiotic (AZO CRANBERRY PO), Take  by mouth daily. magnesium oxide (MAG-OX) 400 MG tablet, Take 400 mg by mouth daily. Warfarin Sodium (COUMADIN PO), Take 5 mg by mouth daily   UNABLE TO FIND, Bilat Knee injections Kenalog every three months  insulin glargine (LANTUS) 100 UNIT/ML injection, Inject 76 Units into the skin nightly   pravastatin (PRAVACHOL) 40 MG tablet, Take 40 mg by mouth daily   OXYGEN, Inhale 3 L into the lungs nightly. Nasal canula  pantoprazole (PROTONIX) 40 MG tablet, Take 1 tablet by mouth daily. levothyroxine (SYNTHROID) 112 MCG tablet, Take 125 mcg by mouth daily. sitaGLIPtan (JANUVIA) 100 MG tablet, Take 100 mg by mouth daily. glimepiride (AMARYL) 4 MG tablet, Take 4 mg by mouth every morning. Allergies:  Ciprofloxacin; Codeine; Nitorfurantoin macrocrystalline [nitrofurantoin]; Pcn [penicillins]; Sulfa antibiotics; and Torsemide    Social History:   TOBACCO:   reports that she has never smoked. She has never used smokeless tobacco.  ETOH:   reports that she does not drink alcohol.   MARITAL STATUS:    OCCUPATION:

## 2018-10-19 LAB
ALBUMIN SERPL-MCNC: 2.8 G/DL (ref 3.5–5.2)
ALP BLD-CCNC: 107 U/L (ref 35–104)
ALT SERPL-CCNC: 46 U/L (ref 0–32)
ANION GAP SERPL CALCULATED.3IONS-SCNC: 10 MMOL/L (ref 7–16)
ANION GAP SERPL CALCULATED.3IONS-SCNC: 14 MMOL/L (ref 7–16)
AST SERPL-CCNC: 55 U/L (ref 0–31)
BASOPHILS ABSOLUTE: 0.01 E9/L (ref 0–0.2)
BASOPHILS RELATIVE PERCENT: 0.1 % (ref 0–2)
BILIRUB SERPL-MCNC: 0.2 MG/DL (ref 0–1.2)
BUN BLDV-MCNC: 48 MG/DL (ref 8–23)
BUN BLDV-MCNC: 51 MG/DL (ref 8–23)
CALCIUM SERPL-MCNC: 8.5 MG/DL (ref 8.6–10.2)
CALCIUM SERPL-MCNC: 8.5 MG/DL (ref 8.6–10.2)
CHLORIDE BLD-SCNC: 96 MMOL/L (ref 98–107)
CHLORIDE BLD-SCNC: 99 MMOL/L (ref 98–107)
CK MB: 4.7 NG/ML (ref 0–4.3)
CO2: 27 MMOL/L (ref 22–29)
CO2: 28 MMOL/L (ref 22–29)
CREAT SERPL-MCNC: 1.5 MG/DL (ref 0.5–1)
CREAT SERPL-MCNC: 1.7 MG/DL (ref 0.5–1)
EOSINOPHILS ABSOLUTE: 0.08 E9/L (ref 0.05–0.5)
EOSINOPHILS RELATIVE PERCENT: 0.8 % (ref 0–6)
GFR AFRICAN AMERICAN: 35
GFR AFRICAN AMERICAN: 41
GFR NON-AFRICAN AMERICAN: 29 ML/MIN/1.73
GFR NON-AFRICAN AMERICAN: 34 ML/MIN/1.73
GLUCOSE BLD-MCNC: 100 MG/DL (ref 74–109)
GLUCOSE BLD-MCNC: 162 MG/DL (ref 74–109)
HCT VFR BLD CALC: 33.4 % (ref 34–48)
HEMOGLOBIN: 10.4 G/DL (ref 11.5–15.5)
IMMATURE GRANULOCYTES #: 0.07 E9/L
IMMATURE GRANULOCYTES %: 0.7 % (ref 0–5)
INR BLD: 4.5
L. PNEUMOPHILA SEROGP 1 UR AG: NORMAL
LACTIC ACID: 0.6 MMOL/L (ref 0.5–2.2)
LACTIC ACID: 0.8 MMOL/L (ref 0.5–2.2)
LACTIC ACID: 0.9 MMOL/L (ref 0.5–2.2)
LACTIC ACID: 1.9 MMOL/L (ref 0.5–2.2)
LACTIC ACID: 2 MMOL/L (ref 0.5–2.2)
LYMPHOCYTES ABSOLUTE: 1 E9/L (ref 1.5–4)
LYMPHOCYTES RELATIVE PERCENT: 9.7 % (ref 20–42)
MCH RBC QN AUTO: 26.9 PG (ref 26–35)
MCHC RBC AUTO-ENTMCNC: 31.1 % (ref 32–34.5)
MCV RBC AUTO: 86.3 FL (ref 80–99.9)
METER GLUCOSE: 110 MG/DL (ref 70–110)
METER GLUCOSE: 169 MG/DL (ref 70–110)
METER GLUCOSE: 171 MG/DL (ref 70–110)
METER GLUCOSE: 172 MG/DL (ref 70–110)
MONOCYTES ABSOLUTE: 1.22 E9/L (ref 0.1–0.95)
MONOCYTES RELATIVE PERCENT: 11.9 % (ref 2–12)
NEUTROPHILS ABSOLUTE: 7.89 E9/L (ref 1.8–7.3)
NEUTROPHILS RELATIVE PERCENT: 76.8 % (ref 43–80)
ORGANISM: ABNORMAL
PDW BLD-RTO: 15.3 FL (ref 11.5–15)
PLATELET # BLD: 222 E9/L (ref 130–450)
PMV BLD AUTO: 11.1 FL (ref 7–12)
POTASSIUM SERPL-SCNC: 3.4 MMOL/L (ref 3.5–5)
POTASSIUM SERPL-SCNC: 3.8 MMOL/L (ref 3.5–5)
PROTHROMBIN TIME: 50.6 SEC (ref 9.3–12.4)
RBC # BLD: 3.87 E12/L (ref 3.5–5.5)
SODIUM BLD-SCNC: 136 MMOL/L (ref 132–146)
SODIUM BLD-SCNC: 138 MMOL/L (ref 132–146)
STREP PNEUMONIAE ANTIGEN, URINE: NORMAL
TOTAL CK: 1265 U/L (ref 20–180)
TOTAL PROTEIN: 5.4 G/DL (ref 6.4–8.3)
URINE CULTURE, ROUTINE: NORMAL
WBC # BLD: 10.3 E9/L (ref 4.5–11.5)

## 2018-10-19 PROCEDURE — 82962 GLUCOSE BLOOD TEST: CPT

## 2018-10-19 PROCEDURE — 1200000000 HC SEMI PRIVATE

## 2018-10-19 PROCEDURE — 6370000000 HC RX 637 (ALT 250 FOR IP): Performed by: INTERNAL MEDICINE

## 2018-10-19 PROCEDURE — 36415 COLL VENOUS BLD VENIPUNCTURE: CPT

## 2018-10-19 PROCEDURE — 6370000000 HC RX 637 (ALT 250 FOR IP): Performed by: STUDENT IN AN ORGANIZED HEALTH CARE EDUCATION/TRAINING PROGRAM

## 2018-10-19 PROCEDURE — 85610 PROTHROMBIN TIME: CPT

## 2018-10-19 PROCEDURE — G8979 MOBILITY GOAL STATUS: HCPCS

## 2018-10-19 PROCEDURE — 83605 ASSAY OF LACTIC ACID: CPT

## 2018-10-19 PROCEDURE — 82553 CREATINE MB FRACTION: CPT

## 2018-10-19 PROCEDURE — 85025 COMPLETE CBC W/AUTO DIFF WBC: CPT

## 2018-10-19 PROCEDURE — G8978 MOBILITY CURRENT STATUS: HCPCS

## 2018-10-19 PROCEDURE — 94640 AIRWAY INHALATION TREATMENT: CPT

## 2018-10-19 PROCEDURE — 94660 CPAP INITIATION&MGMT: CPT

## 2018-10-19 PROCEDURE — 80048 BASIC METABOLIC PNL TOTAL CA: CPT

## 2018-10-19 PROCEDURE — 97161 PT EVAL LOW COMPLEX 20 MIN: CPT

## 2018-10-19 PROCEDURE — 2700000000 HC OXYGEN THERAPY PER DAY

## 2018-10-19 PROCEDURE — 80053 COMPREHEN METABOLIC PANEL: CPT

## 2018-10-19 PROCEDURE — 82550 ASSAY OF CK (CPK): CPT

## 2018-10-19 RX ORDER — MORPHINE SULFATE 2 MG/ML
2 INJECTION, SOLUTION INTRAMUSCULAR; INTRAVENOUS EVERY 4 HOURS PRN
Status: DISCONTINUED | OUTPATIENT
Start: 2018-10-19 | End: 2018-10-22 | Stop reason: HOSPADM

## 2018-10-19 RX ORDER — DOXYCYCLINE HYCLATE 100 MG/1
100 CAPSULE ORAL EVERY 12 HOURS SCHEDULED
Status: DISCONTINUED | OUTPATIENT
Start: 2018-10-19 | End: 2018-10-22 | Stop reason: HOSPADM

## 2018-10-19 RX ORDER — HYDROCODONE BITARTRATE AND ACETAMINOPHEN 5; 325 MG/1; MG/1
1 TABLET ORAL EVERY 6 HOURS PRN
Status: DISCONTINUED | OUTPATIENT
Start: 2018-10-19 | End: 2018-10-22 | Stop reason: HOSPADM

## 2018-10-19 RX ORDER — LEVOFLOXACIN 750 MG/1
750 TABLET ORAL EVERY OTHER DAY
Status: DISCONTINUED | OUTPATIENT
Start: 2018-10-20 | End: 2018-10-19

## 2018-10-19 RX ADMIN — IPRATROPIUM BROMIDE AND ALBUTEROL SULFATE 1 AMPULE: .5; 3 SOLUTION RESPIRATORY (INHALATION) at 20:41

## 2018-10-19 RX ADMIN — IPRATROPIUM BROMIDE AND ALBUTEROL SULFATE 1 AMPULE: .5; 3 SOLUTION RESPIRATORY (INHALATION) at 08:49

## 2018-10-19 RX ADMIN — NYSTATIN: 100000 OINTMENT TOPICAL at 20:48

## 2018-10-19 RX ADMIN — PANTOPRAZOLE SODIUM 40 MG: 40 TABLET, DELAYED RELEASE ORAL at 09:02

## 2018-10-19 RX ADMIN — GABAPENTIN 600 MG: 300 CAPSULE ORAL at 09:02

## 2018-10-19 RX ADMIN — GABAPENTIN 600 MG: 300 CAPSULE ORAL at 20:47

## 2018-10-19 RX ADMIN — INSULIN LISPRO 3 UNITS: 100 INJECTION, SOLUTION INTRAVENOUS; SUBCUTANEOUS at 17:02

## 2018-10-19 RX ADMIN — NYSTATIN: 100000 OINTMENT TOPICAL at 09:03

## 2018-10-19 RX ADMIN — DOXYCYCLINE HYCLATE 100 MG: 100 CAPSULE ORAL at 20:47

## 2018-10-19 RX ADMIN — HYDROCODONE BITARTRATE AND ACETAMINOPHEN 1 TABLET: 5; 325 TABLET ORAL at 17:16

## 2018-10-19 RX ADMIN — IPRATROPIUM BROMIDE AND ALBUTEROL SULFATE 1 AMPULE: .5; 3 SOLUTION RESPIRATORY (INHALATION) at 16:54

## 2018-10-19 RX ADMIN — INSULIN GLARGINE 20 UNITS: 100 INJECTION, SOLUTION SUBCUTANEOUS at 20:48

## 2018-10-19 RX ADMIN — LEVOTHYROXINE SODIUM 125 MCG: 125 TABLET ORAL at 06:25

## 2018-10-19 RX ADMIN — INSULIN LISPRO 3 UNITS: 100 INJECTION, SOLUTION INTRAVENOUS; SUBCUTANEOUS at 12:59

## 2018-10-19 RX ADMIN — INSULIN LISPRO 2 UNITS: 100 INJECTION, SOLUTION INTRAVENOUS; SUBCUTANEOUS at 20:48

## 2018-10-19 ASSESSMENT — PAIN DESCRIPTION - DESCRIPTORS
DESCRIPTORS: ACHING;CONSTANT;DISCOMFORT
DESCRIPTORS: ACHING;CONSTANT;DISCOMFORT

## 2018-10-19 ASSESSMENT — PAIN DESCRIPTION - PAIN TYPE
TYPE: ACUTE PAIN
TYPE: ACUTE PAIN

## 2018-10-19 ASSESSMENT — PAIN DESCRIPTION - ORIENTATION
ORIENTATION: RIGHT;LEFT
ORIENTATION: RIGHT;LEFT

## 2018-10-19 ASSESSMENT — PAIN SCALES - GENERAL
PAINLEVEL_OUTOF10: 0
PAINLEVEL_OUTOF10: 7
PAINLEVEL_OUTOF10: 0
PAINLEVEL_OUTOF10: 7

## 2018-10-19 ASSESSMENT — PAIN DESCRIPTION - LOCATION
LOCATION: FOOT
LOCATION: FOOT

## 2018-10-19 NOTE — CONSULTS
8/23/2013    T 12,  L1,  L2 BONE BIOPSY, EPIDURAL CATHETER    SALPINGO-OOPHORECTOMY      TONSILLECTOMY      URETER STENT PLACEMENT Bilateral 2010    has had this done twice       Current Medications:    Current Facility-Administered Medications: dextrose 5 % solution, 100 mL/hr, Intravenous, PRN  insulin lispro (HUMALOG) injection vial 0-18 Units, 0-18 Units, Subcutaneous, TID WC  insulin lispro (HUMALOG) injection vial 0-9 Units, 0-9 Units, Subcutaneous, Nightly  levothyroxine (SYNTHROID) tablet 125 mcg, 125 mcg, Oral, Daily  pantoprazole (PROTONIX) tablet 40 mg, 40 mg, Oral, Daily  sodium chloride flush 0.9 % injection 10 mL, 10 mL, Intravenous, 2 times per day  sodium chloride flush 0.9 % injection 10 mL, 10 mL, Intravenous, PRN  magnesium hydroxide (MILK OF MAGNESIA) 400 MG/5ML suspension 30 mL, 30 mL, Oral, Daily PRN  ondansetron (ZOFRAN) injection 4 mg, 4 mg, Intravenous, Q6H PRN  ipratropium-albuterol (DUONEB) nebulizer solution 1 ampule, 1 ampule, Inhalation, 4x daily  nystatin (MYCOSTATIN) ointment, , Topical, BID  insulin glargine (LANTUS) injection vial 20 Units, 20 Units, Subcutaneous, Nightly  gabapentin (NEURONTIN) capsule 600 mg, 600 mg, Oral, BID  pneumococcal 13-valent conjugate (PREVNAR) injection 0.5 mL, 0.5 mL, Intramuscular, Prior to discharge  influenza quadrivalent split vaccine (FLUZONE;FLUARIX;FLULAVAL;AFLURIA) injection 0.5 mL, 0.5 mL, Intramuscular, Prior to discharge  cefTRIAXone (ROCEPHIN) 1 g in sterile water 10 mL IV syringe, 1 g, Intravenous, Q24H  azithromycin (ZITHROMAX) 500 mg in D5W 250ml Vial Mate, 500 mg, Intravenous, Q24H  0.9 % sodium chloride infusion, , Intravenous, Continuous  glucose (GLUTOSE) 40 % oral gel 15 g, 15 g, Oral, PRN  dextrose 50 % solution 12.5 g, 12.5 g, Intravenous, PRN  glucagon (rDNA) injection 1 mg, 1 mg, Intramuscular, PRN    Allergies:  Ciprofloxacin; Codeine; Nitorfurantoin macrocrystalline [nitrofurantoin];  Pcn [penicillins]; Sulfa antibiotics; and Radiopaque material is identified within multiple vertebral bodies, consistent with prior vertebral augmentation procedures Lines and Tubes: None. Left-sided pleural effusion with associated left basilar airspace opacity. Recommend clinical correlation. Assessment/Plan    · Chronic kidney disease stage III- baseline Scr 1.7 since 2016, no new work up is planned. Will follow labs daily  · Rhabdomyolysis CK 1615-->1624-->1265. Will follow. · Hypertension with CKD I-IV- controlled on current medications. · Mild anemia of CKD- will check iron studies  · Lactic acidosis- resolving       Thank you for allowing us to participate in care of [address] [place] [address]  10/19/2018  11:15 AM     Patient seen and examined all key components of the physical performed independently , case discussed with NP, all pertinent labs and radiologic tests personally reviewed agree with above.       Mimi Bueno MD

## 2018-10-19 NOTE — CONSULTS
Vascular Surgery Inpatient Consultation Note      Reason for Consultation:  ? Compartment    HISTORY OF PRESENT ILLNESS:                The patient is a 69 y.o. female who is admitted to the hospital for treatment of respiratory distress. The patient fell out of her wheelchair with altered mental status and was brought to the emergency department. She was admitted to the intensive care unit. The patient was found to be in DKA. She had hematomas in both feet. Vascular surgery is consulted for evaluation and treatment. Patient with history of previous DVT and PE. Status post insertion of vena cava filter by Dr. Jarad Gillespie. Patient is nonambulatory and has not walked in over 8 years. IMPRESSION:  Bilateral feet hematomas secondary to fall. The patient does not have compartment syndrome involving her feet. She has hematomas. She has very frail skin and she may develop breakdown. I do not recommend evacuation of the hematomas. I do not feel that she requires any additional vascular testing or intervention. RECOMMENDATIONS:  Tubigrip stockings to his feet and lower legs to help reduce swelling and protect skin from further injury.     Patient Active Problem List   Diagnosis Code    S/P IVC filter Z95.828    History of DVT (deep vein thrombosis) Z86.718    Morbid obesity with BMI of 60.0-69.9, adult ([address] E66.01, Z68.44    DKA (diabetic ketoacidosis) ([address] J22.40    Metabolic encephalopathy B91.62    Acute respiratory failure (HCC) J96.00       Past Medical History:   Diagnosis Date    Diabetes mellitus ([address]     DVT (deep venous thrombosis) (HCC)     GERD (gastroesophageal reflux disease)     Herniated disc     History of DVT (deep vein thrombosis) 12/4/2014    Hyperlipidemia     Hypertension     Kidney calculi 2010    Left leg DVT ([address] 11/15/2013    Osteoarthritis     PE (pulmonary embolism)     S/P carotid endarterectomy 12/4/2013    S/P IVC filter 12/4/2013    Sepsis(995.91)

## 2018-10-19 NOTE — PROGRESS NOTES
Otoniel De La Fuente M.D.,[facility]  Sravan Cardoza D.O., F.A.C.O.I., Darien White M.D. Dewayne Taylor M.D., Odilia Mayorga M.D. Daily Pulmonary Progress Note    Patient:  Per Height 69 y.o. female MRN: [id number]     Date of Service: 10/19/2018      Synopsis   We are following patient for sleep apnea    \"CC\" AMS      Subjective      Patient was seen and examined this am. Chacorta Mcclelland is lying in bed in no apparent distress. She is tolerating 3L via NC. She appears comfortable and rouses easily. No cough. Review of Systems:  Constitutional: Denies fever, weight loss, night sweats, and fatigue  Skin: Denies pigmentation, dark lesions, and rashes   HEENT: Denies hearing loss, tinnitus, ear drainage, epistaxis, sore throat, and hoarseness. Cardiovascular: Denies palpitations, chest pain, and chest pressure. Respiratory: Denies cough, dyspnea at rest, hemoptysis, apnea, and choking. Gastrointestinal: Denies nausea, vomiting, poor appetite, diarrhea, heartburn or reflux  Genitourinary: Denies dysuria, frequency, urgency or hematuria  Musculoskeletal: Denies myalgias, muscle weakness, and bone pain, + pain bilateral feet  Neurological: Denies dizziness, vertigo, headache, and focal weakness, + confusion  Psychological: Denies anxiety and depression  Endocrine: Denies heat intolerance and cold intolerance  Hematopoietic/Lymphatic: Denies bleeding problems and blood transfusions    24-hour events: No acute overnight events      Objective   Vitals: /76   Pulse 110   Temp 98.7 °F (37.1 °C) (Temporal)   Resp 20   Ht 5' 4\" (1.626 m)   Wt 238 lb (108 kg)   SpO2 99%   BMI 40.85 kg/m²   Vent Information  FiO2 : 40 %  I Time/ I Time %: 0.85 s       IPAP: 12 cmH20  EPAP: 6 cmH2O     I & O - 24hr  @input@ 740  @output@ 105    Physical Exam:  General Appearance: appears comfortable in no acute distress.  Obese  HEENT: Normocephalic atraumatic without obvious abnormality   Neck: Lips, due  to S. pneumoniae cannot be ruled out since the antigen present  in the sample may be below the detection limit of the test.       Recent Labs      10/18/18   0023   [id number]  Presumptive NEGATIVE suggesting no recent or current infections  with Legionella pneumophilia serogroup 1. Infection to  Legionella cannot be ruled out since other serogroups and  species may cause infection, antigen may not be present in  early infection, or level of antigen may be below the  detection limit. Assessment:    Acute respiratory failure with hypoxia and hypercapnia   Chest x-ray with left pleural effusion, tiny  Fall out of wheel chair -bilateral lower leg injury with foot hematoma  Rhabdomyolysis -CK 1624 , CK -MB 7.3  Metabolic encephalopathy   Lactic acidosis   leukocytosis   Morbid obesity   Hx of PE and DVT was on coumadin therapy last dose 10/16/18 at 1830   supratherapeutic INR  S/p IVC filter       Plan:   Monitor O2 levels currently on 3 liters NC keep >92%  NIPPV 12/6 at HS and PRN daytime  resp panel negative , urine culture negative , strep and legionella negative  recevied x1 dose of vanco x1 dose cefepime   Stop IV antibiotics and change to doxycycline. CXR is not overly impressive  Vascular surgery consulted: no compartment syndrome, +hematomas  Echo pending  Pharmacy to dose coumadin  X rays of right and left foot s/p fall: osteopenia, no fractures. swelling  High risk for RIKY - sleep study, she is home bound may need home study if no co morbities , will order neck circumference and epworth scale       This plan of care was reviewed in collaboration with Dr. Coreen Duran  Electronically signed by DAVIDSON Stone CNP on 10/19/2018 at 10:50 AM    I personally saw, examined, and cared for the patient. Labs, medications, radiographs reviewed.  I agree with history exam and plans detailed in NP note with the following additions:    Lying in bed on 3L  Feels her breathing is okay, no cough  I am not impressed with her CXR  Change to oral doxycycline to complete 6 more days  Vascular surgery has evaluated her lower extremities which are consistent with hematomas  Nephrology was consulted  Agree needs outpatient PSG    Electronically signed by Matt Lino MD on 10/19/2018 at 2:26 PM

## 2018-10-19 NOTE — PROGRESS NOTES
[facility]  Physical Therapy Initial Evaluation  Name: Noé Alcaraz  : [id number]  MRN: [id number]    Date of Service: 10/19/2018    Evaluating Therapist: Dominique Birmingham PT, DPT  [id number]    Room #: 7011/5188-J  DIAGNOSIS: DKA  PRECAUTIONS: Falls  PMH: DM, GERD, HLD, HTN, OA, PE, s/p Carotid endarterectomy, s/p IVC filter, Sepsis     Social:  Pt lives with her  and daughter in a 1 story home with ramped entry. Pt reports that her  assists her with as much as he can, but he has had 2 CVAs in the past and had difficulty. She reports that her daughter is the primary caregiver  for both the pt and pt's . Pt owns a hospital bed, se lift and WC. Pt reports that she has been bed bound for approximately 2-3 years and does not transfer OOB due to difficulty using se lift. Lift does not go high enough to clear bed mattress surface. Pt stated that she bathes bed level and is incontinent. Initial Evaluation  Date: 10/19/18 Treatment  Short Term/ Long Term   Goals   Was pt agreeable to Eval/treatment? yes     Does pt have pain? Pt reported R foot pain and general discomfort     Bed Mobility  Rolling: Dependent  Supine to sit: NT  Sit to supine: NT Scooting: Dependent x2     Transfers NT     Ambulation   NT     Stair negotiation:  NT     ROM BLE: limited throughout     Strength BLE: 1 to 2-/5       Balance   Sitting: NT  Standing: NT     AM-PAC Basic Mobility Inpatient Short Form Raw Score:         Patient is A&Ox4. Sensation: pt reports BLE \"dead\" knee down  Edema: present in R foot with extensive bruising and bleeding of R toe. ASSESSMENT  Pt displays functional ability as noted in the objective portion of this evaluation. Comments/Treatment:  Pt was received supine and was agreeable to PT evaluation. Extensive discussion regarding pt's DME and PLOF. Pt expressed many concerns and conversation was relayed to SW.   Pt emotional during evaluation due to high level of care/burden for pt's family. Pt was repositioned in bed and left with call button within reach and all needs met. Patient education  Pt educated on PT role. Patient response to education:   Pt verbalized understanding Pt demonstrated skill Pt requires further education in this area   yes       Pts/ family goals   1. None stated      PLAN  Patient appears to be at baseline level of function and will be discharged from PT services. Positioning and PROM deferred to nursing staff.       Time in: 1310  Time out: 3021 Riley Varela, DPT  License [id number]

## 2018-10-20 LAB
ALBUMIN SERPL-MCNC: 2.8 G/DL (ref 3.5–5.2)
ALP BLD-CCNC: 92 U/L (ref 35–104)
ALT SERPL-CCNC: 37 U/L (ref 0–32)
ANION GAP SERPL CALCULATED.3IONS-SCNC: 12 MMOL/L (ref 7–16)
ANION GAP SERPL CALCULATED.3IONS-SCNC: 12 MMOL/L (ref 7–16)
ANION GAP SERPL CALCULATED.3IONS-SCNC: 13 MMOL/L (ref 7–16)
AST SERPL-CCNC: 39 U/L (ref 0–31)
BASOPHILS ABSOLUTE: 0.02 E9/L (ref 0–0.2)
BASOPHILS RELATIVE PERCENT: 0.2 % (ref 0–2)
BILIRUB SERPL-MCNC: <0.2 MG/DL (ref 0–1.2)
BUN BLDV-MCNC: 41 MG/DL (ref 8–23)
BUN BLDV-MCNC: 42 MG/DL (ref 8–23)
BUN BLDV-MCNC: 43 MG/DL (ref 8–23)
CALCIUM SERPL-MCNC: 8.5 MG/DL (ref 8.6–10.2)
CALCIUM SERPL-MCNC: 8.6 MG/DL (ref 8.6–10.2)
CALCIUM SERPL-MCNC: 8.9 MG/DL (ref 8.6–10.2)
CHLORIDE BLD-SCNC: 101 MMOL/L (ref 98–107)
CHLORIDE BLD-SCNC: 102 MMOL/L (ref 98–107)
CHLORIDE BLD-SCNC: 104 MMOL/L (ref 98–107)
CO2: 26 MMOL/L (ref 22–29)
CO2: 26 MMOL/L (ref 22–29)
CO2: 27 MMOL/L (ref 22–29)
CREAT SERPL-MCNC: 0.9 MG/DL (ref 0.5–1)
CREAT SERPL-MCNC: 1.2 MG/DL (ref 0.5–1)
CREAT SERPL-MCNC: 1.2 MG/DL (ref 0.5–1)
EOSINOPHILS ABSOLUTE: 0.23 E9/L (ref 0.05–0.5)
EOSINOPHILS RELATIVE PERCENT: 2.9 % (ref 0–6)
GFR AFRICAN AMERICAN: 53
GFR AFRICAN AMERICAN: 53
GFR AFRICAN AMERICAN: >60
GFR NON-AFRICAN AMERICAN: 44 ML/MIN/1.73
GFR NON-AFRICAN AMERICAN: 44 ML/MIN/1.73
GFR NON-AFRICAN AMERICAN: >60 ML/MIN/1.73
GLUCOSE BLD-MCNC: 197 MG/DL (ref 74–109)
GLUCOSE BLD-MCNC: 92 MG/DL (ref 74–109)
GLUCOSE BLD-MCNC: 94 MG/DL (ref 74–109)
HCT VFR BLD CALC: 31.3 % (ref 34–48)
HEMOGLOBIN: 9.4 G/DL (ref 11.5–15.5)
IMMATURE GRANULOCYTES #: 0.08 E9/L
IMMATURE GRANULOCYTES %: 1 % (ref 0–5)
INR BLD: 3
LACTIC ACID: 0.6 MMOL/L (ref 0.5–2.2)
LACTIC ACID: 0.6 MMOL/L (ref 0.5–2.2)
LACTIC ACID: 1.1 MMOL/L (ref 0.5–2.2)
LV EF: 58 %
LVEF MODALITY: NORMAL
LYMPHOCYTES ABSOLUTE: 0.95 E9/L (ref 1.5–4)
LYMPHOCYTES RELATIVE PERCENT: 11.8 % (ref 20–42)
MCH RBC QN AUTO: 26.3 PG (ref 26–35)
MCHC RBC AUTO-ENTMCNC: 30 % (ref 32–34.5)
MCV RBC AUTO: 87.7 FL (ref 80–99.9)
METER GLUCOSE: 129 MG/DL (ref 70–110)
METER GLUCOSE: 162 MG/DL (ref 70–110)
METER GLUCOSE: 210 MG/DL (ref 70–110)
METER GLUCOSE: 99 MG/DL (ref 70–110)
MONOCYTES ABSOLUTE: 0.79 E9/L (ref 0.1–0.95)
MONOCYTES RELATIVE PERCENT: 9.8 % (ref 2–12)
NEUTROPHILS ABSOLUTE: 5.99 E9/L (ref 1.8–7.3)
NEUTROPHILS RELATIVE PERCENT: 74.3 % (ref 43–80)
PDW BLD-RTO: 15.1 FL (ref 11.5–15)
PLATELET # BLD: 206 E9/L (ref 130–450)
PMV BLD AUTO: 10.8 FL (ref 7–12)
POTASSIUM SERPL-SCNC: 3.5 MMOL/L (ref 3.5–5)
POTASSIUM SERPL-SCNC: 3.9 MMOL/L (ref 3.5–5)
POTASSIUM SERPL-SCNC: 4.1 MMOL/L (ref 3.5–5)
PROTHROMBIN TIME: 32.9 SEC (ref 9.3–12.4)
RBC # BLD: 3.57 E12/L (ref 3.5–5.5)
SODIUM BLD-SCNC: 140 MMOL/L (ref 132–146)
SODIUM BLD-SCNC: 141 MMOL/L (ref 132–146)
SODIUM BLD-SCNC: 142 MMOL/L (ref 132–146)
TOTAL PROTEIN: 5.4 G/DL (ref 6.4–8.3)
WBC # BLD: 8.1 E9/L (ref 4.5–11.5)

## 2018-10-20 PROCEDURE — 6370000000 HC RX 637 (ALT 250 FOR IP): Performed by: STUDENT IN AN ORGANIZED HEALTH CARE EDUCATION/TRAINING PROGRAM

## 2018-10-20 PROCEDURE — 6370000000 HC RX 637 (ALT 250 FOR IP): Performed by: INTERNAL MEDICINE

## 2018-10-20 PROCEDURE — 80048 BASIC METABOLIC PNL TOTAL CA: CPT

## 2018-10-20 PROCEDURE — 2700000000 HC OXYGEN THERAPY PER DAY

## 2018-10-20 PROCEDURE — 80053 COMPREHEN METABOLIC PANEL: CPT

## 2018-10-20 PROCEDURE — 2580000003 HC RX 258: Performed by: INTERNAL MEDICINE

## 2018-10-20 PROCEDURE — 83605 ASSAY OF LACTIC ACID: CPT

## 2018-10-20 PROCEDURE — 85025 COMPLETE CBC W/AUTO DIFF WBC: CPT

## 2018-10-20 PROCEDURE — 1200000000 HC SEMI PRIVATE

## 2018-10-20 PROCEDURE — 85610 PROTHROMBIN TIME: CPT

## 2018-10-20 PROCEDURE — 94660 CPAP INITIATION&MGMT: CPT

## 2018-10-20 PROCEDURE — 93306 TTE W/DOPPLER COMPLETE: CPT

## 2018-10-20 PROCEDURE — 36415 COLL VENOUS BLD VENIPUNCTURE: CPT

## 2018-10-20 PROCEDURE — 6360000002 HC RX W HCPCS: Performed by: INTERNAL MEDICINE

## 2018-10-20 PROCEDURE — 94640 AIRWAY INHALATION TREATMENT: CPT

## 2018-10-20 PROCEDURE — 82962 GLUCOSE BLOOD TEST: CPT

## 2018-10-20 RX ADMIN — PANTOPRAZOLE SODIUM 40 MG: 40 TABLET, DELAYED RELEASE ORAL at 08:27

## 2018-10-20 RX ADMIN — Medication 10 ML: at 09:18

## 2018-10-20 RX ADMIN — IPRATROPIUM BROMIDE AND ALBUTEROL SULFATE 1 AMPULE: .5; 3 SOLUTION RESPIRATORY (INHALATION) at 20:06

## 2018-10-20 RX ADMIN — GABAPENTIN 600 MG: 300 CAPSULE ORAL at 21:08

## 2018-10-20 RX ADMIN — NYSTATIN: 100000 OINTMENT TOPICAL at 21:11

## 2018-10-20 RX ADMIN — INSULIN LISPRO 3 UNITS: 100 INJECTION, SOLUTION INTRAVENOUS; SUBCUTANEOUS at 21:11

## 2018-10-20 RX ADMIN — NYSTATIN: 100000 OINTMENT TOPICAL at 08:27

## 2018-10-20 RX ADMIN — ONDANSETRON 4 MG: 2 INJECTION INTRAMUSCULAR; INTRAVENOUS at 09:18

## 2018-10-20 RX ADMIN — HYDROCODONE BITARTRATE AND ACETAMINOPHEN 1 TABLET: 5; 325 TABLET ORAL at 07:37

## 2018-10-20 RX ADMIN — IPRATROPIUM BROMIDE AND ALBUTEROL SULFATE 1 AMPULE: .5; 3 SOLUTION RESPIRATORY (INHALATION) at 16:10

## 2018-10-20 RX ADMIN — INSULIN GLARGINE 20 UNITS: 100 INJECTION, SOLUTION SUBCUTANEOUS at 21:11

## 2018-10-20 RX ADMIN — GABAPENTIN 600 MG: 300 CAPSULE ORAL at 08:27

## 2018-10-20 RX ADMIN — IPRATROPIUM BROMIDE AND ALBUTEROL SULFATE 1 AMPULE: .5; 3 SOLUTION RESPIRATORY (INHALATION) at 11:37

## 2018-10-20 RX ADMIN — LEVOTHYROXINE SODIUM 125 MCG: 125 TABLET ORAL at 06:12

## 2018-10-20 RX ADMIN — INSULIN LISPRO 3 UNITS: 100 INJECTION, SOLUTION INTRAVENOUS; SUBCUTANEOUS at 17:07

## 2018-10-20 RX ADMIN — DOXYCYCLINE HYCLATE 100 MG: 100 CAPSULE ORAL at 08:27

## 2018-10-20 RX ADMIN — DOXYCYCLINE HYCLATE 100 MG: 100 CAPSULE ORAL at 21:08

## 2018-10-20 ASSESSMENT — PAIN DESCRIPTION - PAIN TYPE: TYPE: ACUTE PAIN

## 2018-10-20 ASSESSMENT — PAIN DESCRIPTION - LOCATION: LOCATION: FOOT

## 2018-10-20 ASSESSMENT — PAIN DESCRIPTION - DESCRIPTORS: DESCRIPTORS: ACHING;CONSTANT;DISCOMFORT;SHOOTING

## 2018-10-20 ASSESSMENT — PAIN DESCRIPTION - ORIENTATION: ORIENTATION: LEFT

## 2018-10-20 ASSESSMENT — PAIN SCALES - GENERAL: PAINLEVEL_OUTOF10: 7

## 2018-10-20 NOTE — PLAN OF CARE
Problem: Falls - Risk of:  Goal: Absence of physical injury  Absence of physical injury   Outcome: Met This Shift      Problem: Risk for Impaired Skin Integrity  Goal: Tissue integrity - skin and mucous membranes  Structural intactness and normal physiological function of skin and  mucous membranes.    Outcome: Met This Shift      Problem: Pain:  Goal: Control of acute pain  Control of acute pain   Outcome: Met This Shift

## 2018-10-21 LAB
ALBUMIN SERPL-MCNC: 2.6 G/DL (ref 3.5–5.2)
ALP BLD-CCNC: 79 U/L (ref 35–104)
ALT SERPL-CCNC: 35 U/L (ref 0–32)
ANION GAP SERPL CALCULATED.3IONS-SCNC: 10 MMOL/L (ref 7–16)
ANION GAP SERPL CALCULATED.3IONS-SCNC: 11 MMOL/L (ref 7–16)
ANION GAP SERPL CALCULATED.3IONS-SCNC: 9 MMOL/L (ref 7–16)
AST SERPL-CCNC: 46 U/L (ref 0–31)
BASOPHILS ABSOLUTE: 0.02 E9/L (ref 0–0.2)
BASOPHILS RELATIVE PERCENT: 0.3 % (ref 0–2)
BILIRUB SERPL-MCNC: <0.2 MG/DL (ref 0–1.2)
BUN BLDV-MCNC: 31 MG/DL (ref 8–23)
BUN BLDV-MCNC: 34 MG/DL (ref 8–23)
BUN BLDV-MCNC: 38 MG/DL (ref 8–23)
CALCIUM SERPL-MCNC: 8.5 MG/DL (ref 8.6–10.2)
CALCIUM SERPL-MCNC: 8.8 MG/DL (ref 8.6–10.2)
CALCIUM SERPL-MCNC: 9 MG/DL (ref 8.6–10.2)
CHLORIDE BLD-SCNC: 104 MMOL/L (ref 98–107)
CHLORIDE BLD-SCNC: 104 MMOL/L (ref 98–107)
CHLORIDE BLD-SCNC: 107 MMOL/L (ref 98–107)
CO2: 25 MMOL/L (ref 22–29)
CO2: 27 MMOL/L (ref 22–29)
CO2: 29 MMOL/L (ref 22–29)
CREAT SERPL-MCNC: 0.8 MG/DL (ref 0.5–1)
CREAT SERPL-MCNC: 0.8 MG/DL (ref 0.5–1)
CREAT SERPL-MCNC: 0.9 MG/DL (ref 0.5–1)
EOSINOPHILS ABSOLUTE: 0.27 E9/L (ref 0.05–0.5)
EOSINOPHILS RELATIVE PERCENT: 4.1 % (ref 0–6)
FERRITIN: 120 NG/ML
GFR AFRICAN AMERICAN: >60
GFR NON-AFRICAN AMERICAN: >60 ML/MIN/1.73
GLUCOSE BLD-MCNC: 144 MG/DL (ref 74–109)
GLUCOSE BLD-MCNC: 88 MG/DL (ref 74–109)
GLUCOSE BLD-MCNC: 95 MG/DL (ref 74–109)
HCT VFR BLD CALC: 29.5 % (ref 34–48)
HEMOGLOBIN: 8.9 G/DL (ref 11.5–15.5)
IMMATURE GRANULOCYTES #: 0.06 E9/L
IMMATURE GRANULOCYTES %: 0.9 % (ref 0–5)
INR BLD: 2.2
IRON SATURATION: 15 % (ref 15–50)
IRON: 39 MCG/DL (ref 37–145)
LACTIC ACID: 0.4 MMOL/L (ref 0.5–2.2)
LACTIC ACID: 0.8 MMOL/L (ref 0.5–2.2)
LACTIC ACID: 0.9 MMOL/L (ref 0.5–2.2)
LACTIC ACID: 1 MMOL/L (ref 0.5–2.2)
LYMPHOCYTES ABSOLUTE: 0.97 E9/L (ref 1.5–4)
LYMPHOCYTES RELATIVE PERCENT: 14.7 % (ref 20–42)
MCH RBC QN AUTO: 26.3 PG (ref 26–35)
MCHC RBC AUTO-ENTMCNC: 30.2 % (ref 32–34.5)
MCV RBC AUTO: 87 FL (ref 80–99.9)
METER GLUCOSE: 135 MG/DL (ref 70–110)
METER GLUCOSE: 167 MG/DL (ref 70–110)
METER GLUCOSE: 187 MG/DL (ref 70–110)
METER GLUCOSE: 91 MG/DL (ref 70–110)
MONOCYTES ABSOLUTE: 0.67 E9/L (ref 0.1–0.95)
MONOCYTES RELATIVE PERCENT: 10.1 % (ref 2–12)
NEUTROPHILS ABSOLUTE: 4.63 E9/L (ref 1.8–7.3)
NEUTROPHILS RELATIVE PERCENT: 69.9 % (ref 43–80)
PDW BLD-RTO: 15.1 FL (ref 11.5–15)
PLATELET # BLD: 213 E9/L (ref 130–450)
PMV BLD AUTO: 10.6 FL (ref 7–12)
POTASSIUM SERPL-SCNC: 3.6 MMOL/L (ref 3.5–5)
POTASSIUM SERPL-SCNC: 3.9 MMOL/L (ref 3.5–5)
POTASSIUM SERPL-SCNC: 4.7 MMOL/L (ref 3.5–5)
PROTHROMBIN TIME: 24.7 SEC (ref 9.3–12.4)
RBC # BLD: 3.39 E12/L (ref 3.5–5.5)
SODIUM BLD-SCNC: 141 MMOL/L (ref 132–146)
SODIUM BLD-SCNC: 142 MMOL/L (ref 132–146)
SODIUM BLD-SCNC: 143 MMOL/L (ref 132–146)
TOTAL IRON BINDING CAPACITY: 264 MCG/DL (ref 250–450)
TOTAL PROTEIN: 5.4 G/DL (ref 6.4–8.3)
WBC # BLD: 6.6 E9/L (ref 4.5–11.5)

## 2018-10-21 PROCEDURE — 6370000000 HC RX 637 (ALT 250 FOR IP): Performed by: STUDENT IN AN ORGANIZED HEALTH CARE EDUCATION/TRAINING PROGRAM

## 2018-10-21 PROCEDURE — 83550 IRON BINDING TEST: CPT

## 2018-10-21 PROCEDURE — 85610 PROTHROMBIN TIME: CPT

## 2018-10-21 PROCEDURE — 94660 CPAP INITIATION&MGMT: CPT

## 2018-10-21 PROCEDURE — 6370000000 HC RX 637 (ALT 250 FOR IP): Performed by: INTERNAL MEDICINE

## 2018-10-21 PROCEDURE — 83605 ASSAY OF LACTIC ACID: CPT

## 2018-10-21 PROCEDURE — 83540 ASSAY OF IRON: CPT

## 2018-10-21 PROCEDURE — 36415 COLL VENOUS BLD VENIPUNCTURE: CPT

## 2018-10-21 PROCEDURE — 82962 GLUCOSE BLOOD TEST: CPT

## 2018-10-21 PROCEDURE — 85025 COMPLETE CBC W/AUTO DIFF WBC: CPT

## 2018-10-21 PROCEDURE — 80053 COMPREHEN METABOLIC PANEL: CPT

## 2018-10-21 PROCEDURE — 1200000000 HC SEMI PRIVATE

## 2018-10-21 PROCEDURE — 82728 ASSAY OF FERRITIN: CPT

## 2018-10-21 PROCEDURE — 2580000003 HC RX 258: Performed by: INTERNAL MEDICINE

## 2018-10-21 PROCEDURE — 94640 AIRWAY INHALATION TREATMENT: CPT

## 2018-10-21 PROCEDURE — 80048 BASIC METABOLIC PNL TOTAL CA: CPT

## 2018-10-21 PROCEDURE — 2700000000 HC OXYGEN THERAPY PER DAY

## 2018-10-21 RX ORDER — WARFARIN SODIUM 5 MG/1
5 TABLET ORAL DAILY
Status: DISCONTINUED | OUTPATIENT
Start: 2018-10-21 | End: 2018-10-21

## 2018-10-21 RX ADMIN — INSULIN LISPRO 3 UNITS: 100 INJECTION, SOLUTION INTRAVENOUS; SUBCUTANEOUS at 16:59

## 2018-10-21 RX ADMIN — Medication 10 ML: at 21:50

## 2018-10-21 RX ADMIN — NYSTATIN: 100000 OINTMENT TOPICAL at 21:49

## 2018-10-21 RX ADMIN — NYSTATIN: 100000 OINTMENT TOPICAL at 09:03

## 2018-10-21 RX ADMIN — IPRATROPIUM BROMIDE AND ALBUTEROL SULFATE 1 AMPULE: .5; 3 SOLUTION RESPIRATORY (INHALATION) at 16:58

## 2018-10-21 RX ADMIN — IPRATROPIUM BROMIDE AND ALBUTEROL SULFATE 1 AMPULE: .5; 3 SOLUTION RESPIRATORY (INHALATION) at 12:25

## 2018-10-21 RX ADMIN — PANTOPRAZOLE SODIUM 40 MG: 40 TABLET, DELAYED RELEASE ORAL at 09:02

## 2018-10-21 RX ADMIN — DOXYCYCLINE HYCLATE 100 MG: 100 CAPSULE ORAL at 09:02

## 2018-10-21 RX ADMIN — INSULIN GLARGINE 20 UNITS: 100 INJECTION, SOLUTION SUBCUTANEOUS at 21:52

## 2018-10-21 RX ADMIN — INSULIN LISPRO 2 UNITS: 100 INJECTION, SOLUTION INTRAVENOUS; SUBCUTANEOUS at 21:51

## 2018-10-21 RX ADMIN — LEVOTHYROXINE SODIUM 125 MCG: 125 TABLET ORAL at 06:25

## 2018-10-21 RX ADMIN — DOXYCYCLINE HYCLATE 100 MG: 100 CAPSULE ORAL at 21:49

## 2018-10-21 RX ADMIN — GABAPENTIN 600 MG: 300 CAPSULE ORAL at 21:49

## 2018-10-21 RX ADMIN — GABAPENTIN 600 MG: 300 CAPSULE ORAL at 09:02

## 2018-10-21 RX ADMIN — IPRATROPIUM BROMIDE AND ALBUTEROL SULFATE 1 AMPULE: .5; 3 SOLUTION RESPIRATORY (INHALATION) at 20:33

## 2018-10-21 ASSESSMENT — PAIN SCALES - GENERAL: PAINLEVEL_OUTOF10: 0

## 2018-10-21 NOTE — PROGRESS NOTES
Subjective: The patient is awake and alert. Feeling better today   No acute complaints     Objective:    /62   Pulse 98   Temp 98.4 °F (36.9 °C) (Temporal)   Resp 16   Ht 5' 4\" (1.626 m)   Wt 252 lb 6.4 oz (114.5 kg)   SpO2 97%   BMI 43.32 kg/m²     In: -   Out: 1600     HEENT: NCAT,  PERRLA, No JVD  Heart:  RRR, no murmurs, gallops, or rubs.   Lungs:  CTA bilaterally, no wheeze, rales or rhonchi  Abd: bowel sounds present, nontender, nondistended, no masses  Extrem:  Plantar contractures with blistering hematomas on both feet      Recent Labs      10/19/18   0625  10/20/18   0602  10/21/18   0439   WBC  10.3  8.1  6.6   HGB  10.4*  9.4*  8.9*   HCT  33.4*  31.3*  29.5*   PLT  222  206  213       Recent Labs      10/20/18   2038  10/21/18   0439  10/21/18   0739   NA  140  143  142   K  3.9  4.7  3.6   CL  101  107  104   CO2  27  25  29   BUN  41*  38*  34*   CREATININE  0.9  0.8  0.9   CALCIUM  8.9  8.8  9.0       Assessment:    Patient Active Problem List   Diagnosis    S/P IVC filter    History of DVT (deep vein thrombosis)    Morbid obesity with BMI of 60.0-69.9, adult ([facility]    DKA (diabetic ketoacidosis) ([facility]    Metabolic encephalopathy    Acute respiratory failure ([facility]       Plan:    Admitted to ICU then transferred to [facility] with tele for evaluation of acute resp failure in morbidly obese woman  On [address] already for h/o DVT , pe - likely lynch of PE is lo suraj given improvement on current therapy  INR 2.2 today - Resume coumadin at 5 po daily once dental input provided (hold today )  Dental consult per family request as she is scheduled for molar extraction - unable d/t transport and difficulty getting out of house   Check pro calcitonin- unremarkable  On abx switched to po doxy   Appreciate vascular input - no plans for hematoma evacuation   DKA resolved, continue ISS , long acting insulin ,renal on board for ckd   Continue  home meds and pain control for feet   pulm following   norco for

## 2018-10-22 VITALS
TEMPERATURE: 98.2 F | OXYGEN SATURATION: 98 % | RESPIRATION RATE: 16 BRPM | WEIGHT: 254.5 LBS | DIASTOLIC BLOOD PRESSURE: 54 MMHG | HEART RATE: 105 BPM | BODY MASS INDEX: 43.45 KG/M2 | HEIGHT: 64 IN | SYSTOLIC BLOOD PRESSURE: 127 MMHG

## 2018-10-22 LAB
ALBUMIN SERPL-MCNC: 3 G/DL (ref 3.5–5.2)
ALP BLD-CCNC: 73 U/L (ref 35–104)
ALT SERPL-CCNC: 27 U/L (ref 0–32)
ANION GAP SERPL CALCULATED.3IONS-SCNC: 10 MMOL/L (ref 7–16)
AST SERPL-CCNC: 25 U/L (ref 0–31)
BASOPHILS ABSOLUTE: 0.03 E9/L (ref 0–0.2)
BASOPHILS RELATIVE PERCENT: 0.4 % (ref 0–2)
BILIRUB SERPL-MCNC: <0.2 MG/DL (ref 0–1.2)
BLOOD CULTURE, ROUTINE: NORMAL
BUN BLDV-MCNC: 25 MG/DL (ref 8–23)
CALCIUM SERPL-MCNC: 8.6 MG/DL (ref 8.6–10.2)
CHLORIDE BLD-SCNC: 106 MMOL/L (ref 98–107)
CO2: 29 MMOL/L (ref 22–29)
CREAT SERPL-MCNC: 0.8 MG/DL (ref 0.5–1)
CULTURE, BLOOD 2: NORMAL
EOSINOPHILS ABSOLUTE: 0.2 E9/L (ref 0.05–0.5)
EOSINOPHILS RELATIVE PERCENT: 2.8 % (ref 0–6)
GFR AFRICAN AMERICAN: >60
GFR NON-AFRICAN AMERICAN: >60 ML/MIN/1.73
GLUCOSE BLD-MCNC: 123 MG/DL (ref 74–109)
HCT VFR BLD CALC: 30.9 % (ref 34–48)
HEMOGLOBIN: 9.3 G/DL (ref 11.5–15.5)
IMMATURE GRANULOCYTES #: 0.1 E9/L
IMMATURE GRANULOCYTES %: 1.4 % (ref 0–5)
INR BLD: 1.5
LACTIC ACID: 0.6 MMOL/L (ref 0.5–2.2)
LACTIC ACID: 0.8 MMOL/L (ref 0.5–2.2)
LYMPHOCYTES ABSOLUTE: 0.84 E9/L (ref 1.5–4)
LYMPHOCYTES RELATIVE PERCENT: 11.6 % (ref 20–42)
MCH RBC QN AUTO: 26.4 PG (ref 26–35)
MCHC RBC AUTO-ENTMCNC: 30.1 % (ref 32–34.5)
MCV RBC AUTO: 87.8 FL (ref 80–99.9)
METER GLUCOSE: 125 MG/DL (ref 70–110)
METER GLUCOSE: 136 MG/DL (ref 70–110)
MONOCYTES ABSOLUTE: 0.78 E9/L (ref 0.1–0.95)
MONOCYTES RELATIVE PERCENT: 10.7 % (ref 2–12)
NEUTROPHILS ABSOLUTE: 5.31 E9/L (ref 1.8–7.3)
NEUTROPHILS RELATIVE PERCENT: 73.1 % (ref 43–80)
PDW BLD-RTO: 15.2 FL (ref 11.5–15)
PLATELET # BLD: 222 E9/L (ref 130–450)
PMV BLD AUTO: 10.6 FL (ref 7–12)
POTASSIUM SERPL-SCNC: 3.8 MMOL/L (ref 3.5–5)
PROTHROMBIN TIME: 16.4 SEC (ref 9.3–12.4)
RBC # BLD: 3.52 E12/L (ref 3.5–5.5)
SODIUM BLD-SCNC: 145 MMOL/L (ref 132–146)
TOTAL PROTEIN: 5.5 G/DL (ref 6.4–8.3)
WBC # BLD: 7.3 E9/L (ref 4.5–11.5)

## 2018-10-22 PROCEDURE — 6370000000 HC RX 637 (ALT 250 FOR IP): Performed by: INTERNAL MEDICINE

## 2018-10-22 PROCEDURE — 83605 ASSAY OF LACTIC ACID: CPT

## 2018-10-22 PROCEDURE — 90670 PCV13 VACCINE IM: CPT | Performed by: INTERNAL MEDICINE

## 2018-10-22 PROCEDURE — G0009 ADMIN PNEUMOCOCCAL VACCINE: HCPCS | Performed by: INTERNAL MEDICINE

## 2018-10-22 PROCEDURE — 2700000000 HC OXYGEN THERAPY PER DAY

## 2018-10-22 PROCEDURE — 36415 COLL VENOUS BLD VENIPUNCTURE: CPT

## 2018-10-22 PROCEDURE — 80053 COMPREHEN METABOLIC PANEL: CPT

## 2018-10-22 PROCEDURE — 6360000002 HC RX W HCPCS: Performed by: INTERNAL MEDICINE

## 2018-10-22 PROCEDURE — 94660 CPAP INITIATION&MGMT: CPT

## 2018-10-22 PROCEDURE — 85025 COMPLETE CBC W/AUTO DIFF WBC: CPT

## 2018-10-22 PROCEDURE — 94640 AIRWAY INHALATION TREATMENT: CPT

## 2018-10-22 PROCEDURE — 6370000000 HC RX 637 (ALT 250 FOR IP): Performed by: STUDENT IN AN ORGANIZED HEALTH CARE EDUCATION/TRAINING PROGRAM

## 2018-10-22 PROCEDURE — 85610 PROTHROMBIN TIME: CPT

## 2018-10-22 PROCEDURE — 2580000003 HC RX 258: Performed by: INTERNAL MEDICINE

## 2018-10-22 PROCEDURE — 82962 GLUCOSE BLOOD TEST: CPT

## 2018-10-22 RX ORDER — GABAPENTIN 300 MG/1
600 CAPSULE ORAL 2 TIMES DAILY
Qty: 90 CAPSULE | Refills: 3 | Status: SHIPPED | OUTPATIENT
Start: 2018-10-22 | End: 2020-01-01

## 2018-10-22 RX ORDER — DOXYCYCLINE HYCLATE 100 MG/1
100 CAPSULE ORAL EVERY 12 HOURS SCHEDULED
Qty: 20 CAPSULE | Refills: 0 | Status: SHIPPED | OUTPATIENT
Start: 2018-10-22 | End: 2018-11-01

## 2018-10-22 RX ADMIN — PNEUMOCOCCAL 13-VALENT CONJUGATE VACCINE 0.5 ML: 2.2; 2.2; 2.2; 2.2; 2.2; 4.4; 2.2; 2.2; 2.2; 2.2; 2.2; 2.2; 2.2 INJECTION, SUSPENSION INTRAMUSCULAR at 17:18

## 2018-10-22 RX ADMIN — IPRATROPIUM BROMIDE AND ALBUTEROL SULFATE 1 AMPULE: .5; 3 SOLUTION RESPIRATORY (INHALATION) at 16:30

## 2018-10-22 RX ADMIN — IPRATROPIUM BROMIDE AND ALBUTEROL SULFATE 1 AMPULE: .5; 3 SOLUTION RESPIRATORY (INHALATION) at 13:58

## 2018-10-22 RX ADMIN — LEVOTHYROXINE SODIUM 125 MCG: 125 TABLET ORAL at 07:05

## 2018-10-22 RX ADMIN — NYSTATIN: 100000 OINTMENT TOPICAL at 10:36

## 2018-10-22 RX ADMIN — IPRATROPIUM BROMIDE AND ALBUTEROL SULFATE 1 AMPULE: .5; 3 SOLUTION RESPIRATORY (INHALATION) at 09:58

## 2018-10-22 RX ADMIN — Medication 10 ML: at 06:16

## 2018-10-22 RX ADMIN — ONDANSETRON 4 MG: 2 INJECTION INTRAMUSCULAR; INTRAVENOUS at 06:12

## 2018-10-22 RX ADMIN — GABAPENTIN 600 MG: 300 CAPSULE ORAL at 10:36

## 2018-10-22 RX ADMIN — PANTOPRAZOLE SODIUM 40 MG: 40 TABLET, DELAYED RELEASE ORAL at 10:36

## 2018-10-22 RX ADMIN — Medication 10 ML: at 10:37

## 2018-10-22 RX ADMIN — DOXYCYCLINE HYCLATE 100 MG: 100 CAPSULE ORAL at 10:36

## 2018-10-22 ASSESSMENT — PAIN SCALES - GENERAL: PAINLEVEL_OUTOF10: 0

## 2018-10-22 NOTE — CARE COORDINATION
Social Work/Discharge Planning    Plan is for pt to return home with [facility]. Will need ambulance transportation home. SW following for any additional discharge needs, awaiting resume [facility] orders.     Electronically signed by TAMIKO Villeda on 10/22/2018 at 10:46 AM

## 2018-10-22 NOTE — PROGRESS NOTES
Discharge instructions reviewed, patient verbalizes understanding. Awaiting transport at this time. Patient phones and updates  of delay in transport. 1825 transported to home in stable condition.

## 2018-10-30 NOTE — DISCHARGE SUMMARY
improved , blistering hematomas on feet     Pt cleared for dc back home - she did not want to go to [facility] /SNF  discussed with family     No results for input(s): WBC, HGB, HCT, PLT in the last 72 hours. No results for input(s): NA, K, CL, CO2, BUN, CREATININE, CALCIUM in the last 72 hours. Invalid input(s): GLU    Xr Foot Left (2 Views)    Result Date: 10/18/2018  Patient MRN:  [id number] : 1940 Age: 69 years Gender: Female Order Date:  10/18/2018 5:45 PM TECHNIQUE/NUMBER OF IMAGES/COMPARISON/CLINICAL HISTORY: Foot left frontal and lateral views 2 images 2 views. Patient has history of diabetes, fall, multiple open wounds. FINDINGS: Severe osteopenia is observed. Cannot see air in the soft tissues. There is diffuse soft tissue swelling. In the 2 available projections cannot conspicuously identify the acute displaced fracture of the metatarsal bones and phalanxes and metatarsal bones are although this is limited study. Additional views of the left foot can be helpful for corroboration. As the patient has difficult to be positional in the additional views can be done with a different angulations from the stomach projections. Vascular arterial calcifications are seen in the soft tissues. Diffuse soft tissue swelling in the left foot. No demonstration of air in the soft tissues. Severe osteopenia. Cannot see conspicuously an acute fracture or dislocation in the left foot presently. See above comments. Xr Foot Right (2 Views)    Result Date: 10/18/2018  Patient MRN:  [id number] : 1940 Age: 69 years Gender: Female Order Date:  10/18/2018 5:45 PM TECHNIQUE/NUMBER OF IMAGES/COMPARISON/CLINICAL HISTORY: Right foot Frontal lateral and oblique projections 3 images 3 views History diabetes fall trauma injury. FINDINGS: Diffuse soft tissue swelling is seen in the right foot but complained dorsal metatarsal area. Cannot see conspicuous air in the soft tissues.  No conspicuous acute fractures are seen in the Left-sided pleural effusion with associated left basilar airspace opacity. Recommend clinical correlation. Discharge Exam:    HEENT: NCAT,  PERRLA, No JVD  Heart:  RRR, no murmurs, gallops, or rubs. Lungs:  CTA bilaterally, no wheeze, rales or rhonchi  Abd: bowel sounds present, nontender, nondistended, no masses  Extrem:  No clubbing, cyanosis, or edema    Disposition: home    Patient Instructions:      Medication List      START taking these medications    doxycycline hyclate 100 MG capsule  Commonly known as:  VIBRAMYCIN  Take 1 capsule by mouth every 12 hours for 10 days     gabapentin 300 MG capsule  Commonly known as:  NEURONTIN  Take 2 capsules by mouth 2 times daily for 30 days. Lakesha Patel CHANGE how you take these medications    levothyroxine 112 MCG tablet  Commonly known as:  SYNTHROID  What changed:  Another medication with the same name was removed. Continue taking this medication, and follow the directions you see here. traMADol-acetaminophen 37.5-325 MG per tablet  Commonly known as:  ULTRACET  Take 1 tablet by mouth every 6 hours  What changed:  · when to take this  · reasons to take this        CONTINUE taking these medications    allopurinol 100 MG tablet  Commonly known as:  ZYLOPRIM  TAKE 1 TABLET BY MOUTH DAILY     AZO CRANBERRY PO     bumetanide 1 MG tablet  Commonly known as:  BUMEX  TAKE 1 TABLET BY MOUTH TWICE DAILY     COUMADIN PO     DULCOLAX PO     glimepiride 4 MG tablet  Commonly known as:  AMARYL     insulin glargine 100 UNIT/ML injection vial  Commonly known as:  LANTUS     magnesium oxide 400 MG tablet  Commonly known as:  MAG-OX     OXYGEN     pantoprazole 40 MG tablet  Commonly known as:  PROTONIX  Take 1 tablet by mouth daily.      potassium citrate 10 MEQ (1080 MG) extended release tablet  Commonly known as:  UROCIT-K  Take 1 tablet by mouth daily     pravastatin 40 MG tablet  Commonly known as:  PRAVACHOL     SITagliptin 100 MG tablet  Commonly known as:  Hanane Suazo

## 2019-10-09 ENCOUNTER — HOSPITAL ENCOUNTER (INPATIENT)
Age: 79
LOS: 1 days | Discharge: HOME HEALTH | DRG: 696 | End: 2019-10-11
Attending: EMERGENCY MEDICINE | Admitting: INTERNAL MEDICINE
Payer: MEDICARE

## 2019-10-09 DIAGNOSIS — N39.0 URINARY TRACT INFECTION WITHOUT HEMATURIA, SITE UNSPECIFIED: Primary | ICD-10-CM

## 2019-10-09 LAB
ALBUMIN SERPL-MCNC: 3.3 G/DL (ref 3.5–5.2)
ALP BLD-CCNC: 77 U/L (ref 35–104)
ALT SERPL-CCNC: 14 U/L (ref 0–32)
ANION GAP SERPL CALCULATED.3IONS-SCNC: 13 MMOL/L (ref 7–16)
AST SERPL-CCNC: 29 U/L (ref 0–31)
BACTERIA: ABNORMAL /HPF
BASOPHILS ABSOLUTE: 0.03 E9/L (ref 0–0.2)
BASOPHILS RELATIVE PERCENT: 0.2 % (ref 0–2)
BILIRUB SERPL-MCNC: <0.2 MG/DL (ref 0–1.2)
BILIRUBIN URINE: NEGATIVE
BLOOD, URINE: ABNORMAL
BUN BLDV-MCNC: 20 MG/DL (ref 8–23)
CALCIUM SERPL-MCNC: 8.9 MG/DL (ref 8.6–10.2)
CHLORIDE BLD-SCNC: 93 MMOL/L (ref 98–107)
CLARITY: ABNORMAL
CO2: 31 MMOL/L (ref 22–29)
COLOR: YELLOW
CREAT SERPL-MCNC: 0.8 MG/DL (ref 0.5–1)
EOSINOPHILS ABSOLUTE: 0.23 E9/L (ref 0.05–0.5)
EOSINOPHILS RELATIVE PERCENT: 1.7 % (ref 0–6)
GFR AFRICAN AMERICAN: >60
GFR NON-AFRICAN AMERICAN: >60 ML/MIN/1.73
GLUCOSE BLD-MCNC: 274 MG/DL (ref 74–99)
GLUCOSE URINE: NEGATIVE MG/DL
HCT VFR BLD CALC: 35.2 % (ref 34–48)
HEMOGLOBIN: 10.3 G/DL (ref 11.5–15.5)
IMMATURE GRANULOCYTES #: 0.05 E9/L
IMMATURE GRANULOCYTES %: 0.4 % (ref 0–5)
INR BLD: 1.7
KETONES, URINE: NEGATIVE MG/DL
LEUKOCYTE ESTERASE, URINE: ABNORMAL
LYMPHOCYTES ABSOLUTE: 1.47 E9/L (ref 1.5–4)
LYMPHOCYTES RELATIVE PERCENT: 10.9 % (ref 20–42)
MCH RBC QN AUTO: 24 PG (ref 26–35)
MCHC RBC AUTO-ENTMCNC: 29.3 % (ref 32–34.5)
MCV RBC AUTO: 82.1 FL (ref 80–99.9)
METER GLUCOSE: 207 MG/DL (ref 74–99)
METER GLUCOSE: 336 MG/DL (ref 74–99)
MONOCYTES ABSOLUTE: 1.14 E9/L (ref 0.1–0.95)
MONOCYTES RELATIVE PERCENT: 8.5 % (ref 2–12)
NEUTROPHILS ABSOLUTE: 10.52 E9/L (ref 1.8–7.3)
NEUTROPHILS RELATIVE PERCENT: 78.3 % (ref 43–80)
NITRITE, URINE: NEGATIVE
PDW BLD-RTO: 15.9 FL (ref 11.5–15)
PH UA: 6 (ref 5–9)
PLATELET # BLD: 354 E9/L (ref 130–450)
PMV BLD AUTO: 10.5 FL (ref 7–12)
POTASSIUM SERPL-SCNC: 3.7 MMOL/L (ref 3.5–5)
PROTEIN UA: 100 MG/DL
PROTHROMBIN TIME: 18.9 SEC (ref 9.3–12.4)
RBC # BLD: 4.29 E12/L (ref 3.5–5.5)
RBC UA: >20 /HPF (ref 0–2)
SODIUM BLD-SCNC: 137 MMOL/L (ref 132–146)
SPECIFIC GRAVITY UA: 1.02 (ref 1–1.03)
TOTAL PROTEIN: 6.5 G/DL (ref 6.4–8.3)
UROBILINOGEN, URINE: 0.2 E.U./DL
WBC # BLD: 13.4 E9/L (ref 4.5–11.5)
WBC UA: >20 /HPF (ref 0–5)

## 2019-10-09 PROCEDURE — 80053 COMPREHEN METABOLIC PANEL: CPT

## 2019-10-09 PROCEDURE — 87088 URINE BACTERIA CULTURE: CPT

## 2019-10-09 PROCEDURE — 2580000003 HC RX 258: Performed by: EMERGENCY MEDICINE

## 2019-10-09 PROCEDURE — 85025 COMPLETE CBC W/AUTO DIFF WBC: CPT

## 2019-10-09 PROCEDURE — 99284 EMERGENCY DEPT VISIT MOD MDM: CPT

## 2019-10-09 PROCEDURE — 36415 COLL VENOUS BLD VENIPUNCTURE: CPT

## 2019-10-09 PROCEDURE — 6370000000 HC RX 637 (ALT 250 FOR IP): Performed by: PHYSICIAN ASSISTANT

## 2019-10-09 PROCEDURE — 87040 BLOOD CULTURE FOR BACTERIA: CPT

## 2019-10-09 PROCEDURE — 6360000002 HC RX W HCPCS: Performed by: EMERGENCY MEDICINE

## 2019-10-09 PROCEDURE — 81001 URINALYSIS AUTO W/SCOPE: CPT

## 2019-10-09 PROCEDURE — G0378 HOSPITAL OBSERVATION PER HR: HCPCS

## 2019-10-09 PROCEDURE — 82962 GLUCOSE BLOOD TEST: CPT

## 2019-10-09 PROCEDURE — 96366 THER/PROPH/DIAG IV INF ADDON: CPT

## 2019-10-09 PROCEDURE — 96365 THER/PROPH/DIAG IV INF INIT: CPT

## 2019-10-09 PROCEDURE — 85610 PROTHROMBIN TIME: CPT

## 2019-10-09 RX ORDER — SODIUM CHLORIDE 0.9 % (FLUSH) 0.9 %
10 SYRINGE (ML) INJECTION EVERY 12 HOURS SCHEDULED
Status: DISCONTINUED | OUTPATIENT
Start: 2019-10-09 | End: 2019-10-11 | Stop reason: HOSPADM

## 2019-10-09 RX ORDER — LEVOTHYROXINE SODIUM 0.15 MG/1
150 TABLET ORAL DAILY
Status: DISCONTINUED | OUTPATIENT
Start: 2019-10-10 | End: 2019-10-11 | Stop reason: HOSPADM

## 2019-10-09 RX ORDER — BUMETANIDE 1 MG/1
1 TABLET ORAL 2 TIMES DAILY
Status: DISCONTINUED | OUTPATIENT
Start: 2019-10-09 | End: 2019-10-11 | Stop reason: HOSPADM

## 2019-10-09 RX ORDER — SUCRALFATE 1 G/1
1 TABLET ORAL 3 TIMES DAILY
Status: DISCONTINUED | OUTPATIENT
Start: 2019-10-09 | End: 2019-10-11 | Stop reason: HOSPADM

## 2019-10-09 RX ORDER — ALLOPURINOL 300 MG/1
300 TABLET ORAL DAILY
Status: DISCONTINUED | OUTPATIENT
Start: 2019-10-09 | End: 2019-10-11 | Stop reason: HOSPADM

## 2019-10-09 RX ORDER — FUROSEMIDE 20 MG/1
20 TABLET ORAL DAILY
Status: ON HOLD | COMMUNITY
End: 2019-10-11 | Stop reason: HOSPADM

## 2019-10-09 RX ORDER — INSULIN GLARGINE 100 [IU]/ML
18 INJECTION, SOLUTION SUBCUTANEOUS NIGHTLY
Status: DISCONTINUED | OUTPATIENT
Start: 2019-10-09 | End: 2019-10-11 | Stop reason: HOSPADM

## 2019-10-09 RX ORDER — VENLAFAXINE 75 MG/1
150 TABLET ORAL NIGHTLY
Status: DISCONTINUED | OUTPATIENT
Start: 2019-10-09 | End: 2019-10-11 | Stop reason: HOSPADM

## 2019-10-09 RX ORDER — ONDANSETRON 2 MG/ML
4 INJECTION INTRAMUSCULAR; INTRAVENOUS EVERY 6 HOURS PRN
Status: DISCONTINUED | OUTPATIENT
Start: 2019-10-09 | End: 2019-10-11 | Stop reason: HOSPADM

## 2019-10-09 RX ORDER — ACETAMINOPHEN 325 MG/1
325 TABLET ORAL EVERY 6 HOURS PRN
Status: DISCONTINUED | OUTPATIENT
Start: 2019-10-09 | End: 2019-10-11 | Stop reason: HOSPADM

## 2019-10-09 RX ORDER — TRAMADOL HYDROCHLORIDE 50 MG/1
50 TABLET ORAL EVERY 6 HOURS PRN
Status: DISCONTINUED | OUTPATIENT
Start: 2019-10-09 | End: 2019-10-11 | Stop reason: HOSPADM

## 2019-10-09 RX ORDER — BUSPIRONE HYDROCHLORIDE 5 MG/1
7.5 TABLET ORAL 2 TIMES DAILY
Status: DISCONTINUED | OUTPATIENT
Start: 2019-10-09 | End: 2019-10-11 | Stop reason: HOSPADM

## 2019-10-09 RX ORDER — TRAZODONE HYDROCHLORIDE 50 MG/1
50 TABLET ORAL NIGHTLY
Status: DISCONTINUED | OUTPATIENT
Start: 2019-10-09 | End: 2019-10-11 | Stop reason: HOSPADM

## 2019-10-09 RX ORDER — DEXTROSE MONOHYDRATE 50 MG/ML
100 INJECTION, SOLUTION INTRAVENOUS PRN
Status: DISCONTINUED | OUTPATIENT
Start: 2019-10-09 | End: 2019-10-11 | Stop reason: HOSPADM

## 2019-10-09 RX ORDER — GLIMEPIRIDE 4 MG/1
4 TABLET ORAL EVERY MORNING
Status: DISCONTINUED | OUTPATIENT
Start: 2019-10-10 | End: 2019-10-11 | Stop reason: HOSPADM

## 2019-10-09 RX ORDER — ACETAMINOPHEN 325 MG/1
650 TABLET ORAL EVERY 4 HOURS PRN
Status: DISCONTINUED | OUTPATIENT
Start: 2019-10-09 | End: 2019-10-11 | Stop reason: HOSPADM

## 2019-10-09 RX ORDER — VENLAFAXINE 100 MG/1
150 TABLET ORAL NIGHTLY
COMMUNITY
End: 2020-01-01

## 2019-10-09 RX ORDER — OXYBUTYNIN CHLORIDE 5 MG/1
10 TABLET, EXTENDED RELEASE ORAL 2 TIMES DAILY
COMMUNITY

## 2019-10-09 RX ORDER — DEXTROSE MONOHYDRATE 25 G/50ML
12.5 INJECTION, SOLUTION INTRAVENOUS PRN
Status: DISCONTINUED | OUTPATIENT
Start: 2019-10-09 | End: 2019-10-11 | Stop reason: HOSPADM

## 2019-10-09 RX ORDER — GABAPENTIN 300 MG/1
600 CAPSULE ORAL 2 TIMES DAILY
Status: DISCONTINUED | OUTPATIENT
Start: 2019-10-09 | End: 2019-10-11 | Stop reason: HOSPADM

## 2019-10-09 RX ORDER — OXYBUTYNIN CHLORIDE 10 MG/1
10 TABLET, EXTENDED RELEASE ORAL 2 TIMES DAILY
Status: DISCONTINUED | OUTPATIENT
Start: 2019-10-09 | End: 2019-10-11 | Stop reason: HOSPADM

## 2019-10-09 RX ORDER — POTASSIUM CHLORIDE 7.45 MG/ML
10 INJECTION INTRAVENOUS PRN
Status: DISCONTINUED | OUTPATIENT
Start: 2019-10-09 | End: 2019-10-11 | Stop reason: HOSPADM

## 2019-10-09 RX ORDER — PANTOPRAZOLE SODIUM 40 MG/1
40 TABLET, DELAYED RELEASE ORAL DAILY
Status: DISCONTINUED | OUTPATIENT
Start: 2019-10-09 | End: 2019-10-11 | Stop reason: HOSPADM

## 2019-10-09 RX ORDER — PRAVASTATIN SODIUM 20 MG
40 TABLET ORAL DAILY
Status: DISCONTINUED | OUTPATIENT
Start: 2019-10-09 | End: 2019-10-11 | Stop reason: HOSPADM

## 2019-10-09 RX ORDER — SUCRALFATE 1 G/1
1 TABLET ORAL 4 TIMES DAILY
Status: DISCONTINUED | OUTPATIENT
Start: 2019-10-09 | End: 2019-10-09

## 2019-10-09 RX ORDER — TRAZODONE HYDROCHLORIDE 50 MG/1
50 TABLET ORAL NIGHTLY
COMMUNITY
End: 2021-01-01

## 2019-10-09 RX ORDER — ANTIOX #8/OM3/DHA/EPA/LUT/ZEAX 250-2.5 MG
1 CAPSULE ORAL 2 TIMES DAILY
COMMUNITY

## 2019-10-09 RX ORDER — SODIUM CHLORIDE 9 MG/ML
INJECTION, SOLUTION INTRAVENOUS CONTINUOUS
Status: DISCONTINUED | OUTPATIENT
Start: 2019-10-09 | End: 2019-10-11

## 2019-10-09 RX ORDER — POTASSIUM CHLORIDE 20 MEQ/1
40 TABLET, EXTENDED RELEASE ORAL PRN
Status: DISCONTINUED | OUTPATIENT
Start: 2019-10-09 | End: 2019-10-11 | Stop reason: HOSPADM

## 2019-10-09 RX ORDER — WARFARIN SODIUM 5 MG/1
5 TABLET ORAL DAILY
Status: DISCONTINUED | OUTPATIENT
Start: 2019-10-09 | End: 2019-10-09

## 2019-10-09 RX ORDER — NICOTINE POLACRILEX 4 MG
15 LOZENGE BUCCAL PRN
Status: DISCONTINUED | OUTPATIENT
Start: 2019-10-09 | End: 2019-10-11 | Stop reason: HOSPADM

## 2019-10-09 RX ORDER — BUSPIRONE HYDROCHLORIDE 7.5 MG/1
7.5 TABLET ORAL 2 TIMES DAILY
COMMUNITY

## 2019-10-09 RX ORDER — SODIUM CHLORIDE 0.9 % (FLUSH) 0.9 %
10 SYRINGE (ML) INJECTION PRN
Status: DISCONTINUED | OUTPATIENT
Start: 2019-10-09 | End: 2019-10-11 | Stop reason: HOSPADM

## 2019-10-09 RX ADMIN — SODIUM CHLORIDE: 9 INJECTION, SOLUTION INTRAVENOUS at 18:39

## 2019-10-09 RX ADMIN — SUCRALFATE 1 G: 1 TABLET ORAL at 21:17

## 2019-10-09 RX ADMIN — INSULIN LISPRO 1 UNITS: 100 INJECTION, SOLUTION INTRAVENOUS; SUBCUTANEOUS at 22:23

## 2019-10-09 RX ADMIN — INSULIN GLARGINE 18 UNITS: 100 INJECTION, SOLUTION SUBCUTANEOUS at 21:28

## 2019-10-09 RX ADMIN — GABAPENTIN 600 MG: 300 CAPSULE ORAL at 21:18

## 2019-10-09 RX ADMIN — APIXABAN 5 MG: 5 TABLET, FILM COATED ORAL at 21:18

## 2019-10-09 RX ADMIN — ALLOPURINOL 300 MG: 300 TABLET ORAL at 21:17

## 2019-10-09 RX ADMIN — INSULIN LISPRO 4 UNITS: 100 INJECTION, SOLUTION INTRAVENOUS; SUBCUTANEOUS at 19:08

## 2019-10-09 RX ADMIN — VENLAFAXINE 150 MG: 75 TABLET ORAL at 21:18

## 2019-10-09 RX ADMIN — PANTOPRAZOLE SODIUM 40 MG: 40 TABLET, DELAYED RELEASE ORAL at 21:18

## 2019-10-09 RX ADMIN — LINAGLIPTIN 5 MG: 5 TABLET, FILM COATED ORAL at 21:18

## 2019-10-09 RX ADMIN — BUMETANIDE 1 MG: 1 TABLET ORAL at 21:18

## 2019-10-09 RX ADMIN — PRAVASTATIN SODIUM 40 MG: 20 TABLET ORAL at 21:18

## 2019-10-09 RX ADMIN — TRAZODONE HYDROCHLORIDE 50 MG: 50 TABLET ORAL at 21:18

## 2019-10-09 RX ADMIN — MEROPENEM 1 G: 1 INJECTION, POWDER, FOR SOLUTION INTRAVENOUS at 16:47

## 2019-10-09 RX ADMIN — SODIUM CHLORIDE: 9 INJECTION, SOLUTION INTRAVENOUS at 15:09

## 2019-10-09 RX ADMIN — OXYBUTYNIN CHLORIDE 10 MG: 10 TABLET, EXTENDED RELEASE ORAL at 21:17

## 2019-10-09 RX ADMIN — BUSPIRONE HYDROCHLORIDE 7.5 MG: 5 TABLET ORAL at 21:18

## 2019-10-09 ASSESSMENT — ENCOUNTER SYMPTOMS
NAUSEA: 0
SORE THROAT: 0
VOMITING: 0
SHORTNESS OF BREATH: 0
COUGH: 0
BACK PAIN: 0
CHEST TIGHTNESS: 0
ABDOMINAL PAIN: 0
SINUS PAIN: 0
DIARRHEA: 0

## 2019-10-09 ASSESSMENT — PAIN SCALES - GENERAL: PAINLEVEL_OUTOF10: 0

## 2019-10-10 PROBLEM — R26.2 AMBULATORY DYSFUNCTION: Chronic | Status: ACTIVE | Noted: 2019-10-10

## 2019-10-10 PROBLEM — E66.01 MORBID OBESITY (HCC): Status: ACTIVE | Noted: 2018-10-17

## 2019-10-10 PROBLEM — T83.511A CATHETER-ASSOCIATED URINARY TRACT INFECTION (HCC): Status: ACTIVE | Noted: 2019-10-09

## 2019-10-10 PROBLEM — N39.0 PSEUDOMONAS URINARY TRACT INFECTION: Status: ACTIVE | Noted: 2019-10-10

## 2019-10-10 PROBLEM — B96.5 PSEUDOMONAS URINARY TRACT INFECTION: Status: ACTIVE | Noted: 2019-10-10

## 2019-10-10 PROBLEM — R26.2 AMBULATORY DYSFUNCTION: Status: ACTIVE | Noted: 2019-10-10

## 2019-10-10 LAB
ANION GAP SERPL CALCULATED.3IONS-SCNC: 13 MMOL/L (ref 7–16)
BASOPHILS ABSOLUTE: 0 E9/L (ref 0–0.2)
BASOPHILS RELATIVE PERCENT: 0.2 % (ref 0–2)
BUN BLDV-MCNC: 15 MG/DL (ref 8–23)
CALCIUM SERPL-MCNC: 8.9 MG/DL (ref 8.6–10.2)
CHLORIDE BLD-SCNC: 103 MMOL/L (ref 98–107)
CO2: 30 MMOL/L (ref 22–29)
CREAT SERPL-MCNC: 0.8 MG/DL (ref 0.5–1)
EOSINOPHILS ABSOLUTE: 0.11 E9/L (ref 0.05–0.5)
EOSINOPHILS RELATIVE PERCENT: 0.9 % (ref 0–6)
GFR AFRICAN AMERICAN: >60
GFR NON-AFRICAN AMERICAN: >60 ML/MIN/1.73
GLUCOSE BLD-MCNC: 110 MG/DL (ref 74–99)
HCT VFR BLD CALC: 33.2 % (ref 34–48)
HEMOGLOBIN: 9.4 G/DL (ref 11.5–15.5)
HYPOCHROMIA: ABNORMAL
INR BLD: 1.5
LYMPHOCYTES ABSOLUTE: 1.6 E9/L (ref 1.5–4)
LYMPHOCYTES RELATIVE PERCENT: 13 % (ref 20–42)
MCH RBC QN AUTO: 23.9 PG (ref 26–35)
MCHC RBC AUTO-ENTMCNC: 28.3 % (ref 32–34.5)
MCV RBC AUTO: 84.3 FL (ref 80–99.9)
METER GLUCOSE: 109 MG/DL (ref 74–99)
METER GLUCOSE: 139 MG/DL (ref 74–99)
METER GLUCOSE: 141 MG/DL (ref 74–99)
METER GLUCOSE: 172 MG/DL (ref 74–99)
MONOCYTES ABSOLUTE: 0.49 E9/L (ref 0.1–0.95)
MONOCYTES RELATIVE PERCENT: 4.3 % (ref 2–12)
NEUTROPHILS ABSOLUTE: 10.09 E9/L (ref 1.8–7.3)
NEUTROPHILS RELATIVE PERCENT: 81.7 % (ref 43–80)
OVALOCYTES: ABNORMAL
PDW BLD-RTO: 15.8 FL (ref 11.5–15)
PLATELET # BLD: 314 E9/L (ref 130–450)
PMV BLD AUTO: 10.6 FL (ref 7–12)
POIKILOCYTES: ABNORMAL
POLYCHROMASIA: ABNORMAL
POTASSIUM REFLEX MAGNESIUM: 3.6 MMOL/L (ref 3.5–5)
PROTHROMBIN TIME: 17.5 SEC (ref 9.3–12.4)
RBC # BLD: 3.94 E12/L (ref 3.5–5.5)
SODIUM BLD-SCNC: 146 MMOL/L (ref 132–146)
URINE CULTURE, ROUTINE: NORMAL
WBC # BLD: 12.3 E9/L (ref 4.5–11.5)

## 2019-10-10 PROCEDURE — 85610 PROTHROMBIN TIME: CPT

## 2019-10-10 PROCEDURE — 1200000000 HC SEMI PRIVATE

## 2019-10-10 PROCEDURE — 82962 GLUCOSE BLOOD TEST: CPT

## 2019-10-10 PROCEDURE — 6360000002 HC RX W HCPCS: Performed by: PHYSICIAN ASSISTANT

## 2019-10-10 PROCEDURE — 2580000003 HC RX 258: Performed by: PHYSICIAN ASSISTANT

## 2019-10-10 PROCEDURE — 36415 COLL VENOUS BLD VENIPUNCTURE: CPT

## 2019-10-10 PROCEDURE — 6370000000 HC RX 637 (ALT 250 FOR IP): Performed by: PHYSICIAN ASSISTANT

## 2019-10-10 PROCEDURE — 85025 COMPLETE CBC W/AUTO DIFF WBC: CPT

## 2019-10-10 PROCEDURE — 80048 BASIC METABOLIC PNL TOTAL CA: CPT

## 2019-10-10 RX ADMIN — SUCRALFATE 1 G: 1 TABLET ORAL at 10:35

## 2019-10-10 RX ADMIN — SUCRALFATE 1 G: 1 TABLET ORAL at 20:09

## 2019-10-10 RX ADMIN — INSULIN GLARGINE 18 UNITS: 100 INJECTION, SOLUTION SUBCUTANEOUS at 20:53

## 2019-10-10 RX ADMIN — OXYBUTYNIN CHLORIDE 10 MG: 10 TABLET, EXTENDED RELEASE ORAL at 10:37

## 2019-10-10 RX ADMIN — MEROPENEM 1 G: 1 INJECTION, POWDER, FOR SOLUTION INTRAVENOUS at 00:33

## 2019-10-10 RX ADMIN — GABAPENTIN 600 MG: 300 CAPSULE ORAL at 10:35

## 2019-10-10 RX ADMIN — BUSPIRONE HYDROCHLORIDE 7.5 MG: 5 TABLET ORAL at 17:35

## 2019-10-10 RX ADMIN — BUMETANIDE 1 MG: 1 TABLET ORAL at 06:10

## 2019-10-10 RX ADMIN — ALLOPURINOL 300 MG: 300 TABLET ORAL at 10:38

## 2019-10-10 RX ADMIN — INSULIN LISPRO 1 UNITS: 100 INJECTION, SOLUTION INTRAVENOUS; SUBCUTANEOUS at 16:22

## 2019-10-10 RX ADMIN — PANTOPRAZOLE SODIUM 40 MG: 40 TABLET, DELAYED RELEASE ORAL at 10:36

## 2019-10-10 RX ADMIN — APIXABAN 5 MG: 5 TABLET, FILM COATED ORAL at 20:52

## 2019-10-10 RX ADMIN — INSULIN LISPRO 1 UNITS: 100 INJECTION, SOLUTION INTRAVENOUS; SUBCUTANEOUS at 13:32

## 2019-10-10 RX ADMIN — LEVOTHYROXINE SODIUM 150 MCG: 0.15 TABLET ORAL at 06:10

## 2019-10-10 RX ADMIN — BUMETANIDE 1 MG: 1 TABLET ORAL at 17:34

## 2019-10-10 RX ADMIN — MEROPENEM 1 G: 1 INJECTION, POWDER, FOR SOLUTION INTRAVENOUS at 17:34

## 2019-10-10 RX ADMIN — TRAZODONE HYDROCHLORIDE 50 MG: 50 TABLET ORAL at 20:52

## 2019-10-10 RX ADMIN — Medication 10 ML: at 21:46

## 2019-10-10 RX ADMIN — PRAVASTATIN SODIUM 40 MG: 20 TABLET ORAL at 10:38

## 2019-10-10 RX ADMIN — GLIMEPIRIDE 4 MG: 4 TABLET ORAL at 10:38

## 2019-10-10 RX ADMIN — BUSPIRONE HYDROCHLORIDE 7.5 MG: 5 TABLET ORAL at 10:37

## 2019-10-10 RX ADMIN — MEROPENEM 1 G: 1 INJECTION, POWDER, FOR SOLUTION INTRAVENOUS at 11:11

## 2019-10-10 RX ADMIN — APIXABAN 5 MG: 5 TABLET, FILM COATED ORAL at 10:36

## 2019-10-10 RX ADMIN — VENLAFAXINE 150 MG: 75 TABLET ORAL at 20:52

## 2019-10-10 RX ADMIN — OXYBUTYNIN CHLORIDE 10 MG: 10 TABLET, EXTENDED RELEASE ORAL at 20:52

## 2019-10-10 RX ADMIN — SUCRALFATE 1 G: 1 TABLET ORAL at 14:18

## 2019-10-10 RX ADMIN — GABAPENTIN 600 MG: 300 CAPSULE ORAL at 20:52

## 2019-10-10 RX ADMIN — LINAGLIPTIN 5 MG: 5 TABLET, FILM COATED ORAL at 10:35

## 2019-10-10 ASSESSMENT — PAIN SCALES - GENERAL
PAINLEVEL_OUTOF10: 0

## 2019-10-11 VITALS
TEMPERATURE: 97.9 F | BODY MASS INDEX: 39.5 KG/M2 | SYSTOLIC BLOOD PRESSURE: 127 MMHG | DIASTOLIC BLOOD PRESSURE: 61 MMHG | WEIGHT: 231.4 LBS | RESPIRATION RATE: 17 BRPM | OXYGEN SATURATION: 93 % | HEART RATE: 95 BPM | HEIGHT: 64 IN

## 2019-10-11 PROBLEM — R82.81 BACTERIURIA WITH PYURIA: Status: ACTIVE | Noted: 2019-10-10

## 2019-10-11 PROBLEM — R82.71 BACTERIURIA WITH PYURIA: Status: ACTIVE | Noted: 2019-10-10

## 2019-10-11 LAB
ANION GAP SERPL CALCULATED.3IONS-SCNC: 14 MMOL/L (ref 7–16)
BASOPHILS ABSOLUTE: 0.02 E9/L (ref 0–0.2)
BASOPHILS RELATIVE PERCENT: 0.3 % (ref 0–2)
BUN BLDV-MCNC: 13 MG/DL (ref 8–23)
CALCIUM SERPL-MCNC: 8.6 MG/DL (ref 8.6–10.2)
CHLORIDE BLD-SCNC: 105 MMOL/L (ref 98–107)
CO2: 25 MMOL/L (ref 22–29)
CREAT SERPL-MCNC: 0.8 MG/DL (ref 0.5–1)
EOSINOPHILS ABSOLUTE: 0.29 E9/L (ref 0.05–0.5)
EOSINOPHILS RELATIVE PERCENT: 3.8 % (ref 0–6)
GFR AFRICAN AMERICAN: >60
GFR NON-AFRICAN AMERICAN: >60 ML/MIN/1.73
GLUCOSE BLD-MCNC: 72 MG/DL (ref 74–99)
HCT VFR BLD CALC: 31.9 % (ref 34–48)
HEMOGLOBIN: 9.2 G/DL (ref 11.5–15.5)
HYPOCHROMIA: ABNORMAL
IMMATURE GRANULOCYTES #: 0.03 E9/L
IMMATURE GRANULOCYTES %: 0.4 % (ref 0–5)
INR BLD: 1.6
LYMPHOCYTES ABSOLUTE: 0.91 E9/L (ref 1.5–4)
LYMPHOCYTES RELATIVE PERCENT: 12.1 % (ref 20–42)
MCH RBC QN AUTO: 23.9 PG (ref 26–35)
MCHC RBC AUTO-ENTMCNC: 28.8 % (ref 32–34.5)
MCV RBC AUTO: 82.9 FL (ref 80–99.9)
METER GLUCOSE: 136 MG/DL (ref 74–99)
METER GLUCOSE: 173 MG/DL (ref 74–99)
METER GLUCOSE: 54 MG/DL (ref 74–99)
METER GLUCOSE: 58 MG/DL (ref 74–99)
METER GLUCOSE: 77 MG/DL (ref 74–99)
MONOCYTES ABSOLUTE: 0.69 E9/L (ref 0.1–0.95)
MONOCYTES RELATIVE PERCENT: 9.2 % (ref 2–12)
NEUTROPHILS ABSOLUTE: 5.6 E9/L (ref 1.8–7.3)
NEUTROPHILS RELATIVE PERCENT: 74.2 % (ref 43–80)
OVALOCYTES: ABNORMAL
PDW BLD-RTO: 15.9 FL (ref 11.5–15)
PLATELET # BLD: 300 E9/L (ref 130–450)
PMV BLD AUTO: 10.5 FL (ref 7–12)
POIKILOCYTES: ABNORMAL
POLYCHROMASIA: ABNORMAL
POTASSIUM REFLEX MAGNESIUM: 3.6 MMOL/L (ref 3.5–5)
PROTHROMBIN TIME: 17.7 SEC (ref 9.3–12.4)
RBC # BLD: 3.85 E12/L (ref 3.5–5.5)
SODIUM BLD-SCNC: 144 MMOL/L (ref 132–146)
TEAR DROP CELLS: ABNORMAL
WBC # BLD: 7.5 E9/L (ref 4.5–11.5)

## 2019-10-11 PROCEDURE — 85610 PROTHROMBIN TIME: CPT

## 2019-10-11 PROCEDURE — 2580000003 HC RX 258: Performed by: PHYSICIAN ASSISTANT

## 2019-10-11 PROCEDURE — 2580000003 HC RX 258: Performed by: EMERGENCY MEDICINE

## 2019-10-11 PROCEDURE — 85025 COMPLETE CBC W/AUTO DIFF WBC: CPT

## 2019-10-11 PROCEDURE — 82962 GLUCOSE BLOOD TEST: CPT

## 2019-10-11 PROCEDURE — 36415 COLL VENOUS BLD VENIPUNCTURE: CPT

## 2019-10-11 PROCEDURE — 80048 BASIC METABOLIC PNL TOTAL CA: CPT

## 2019-10-11 PROCEDURE — 6360000002 HC RX W HCPCS: Performed by: PHYSICIAN ASSISTANT

## 2019-10-11 PROCEDURE — 6370000000 HC RX 637 (ALT 250 FOR IP): Performed by: PHYSICIAN ASSISTANT

## 2019-10-11 RX ORDER — ALLOPURINOL 300 MG/1
300 TABLET ORAL DAILY
Qty: 30 TABLET | Refills: 0
Start: 2019-10-12

## 2019-10-11 RX ADMIN — BUMETANIDE 1 MG: 1 TABLET ORAL at 05:38

## 2019-10-11 RX ADMIN — APIXABAN 5 MG: 5 TABLET, FILM COATED ORAL at 08:28

## 2019-10-11 RX ADMIN — Medication 10 ML: at 05:43

## 2019-10-11 RX ADMIN — SUCRALFATE 1 G: 1 TABLET ORAL at 08:30

## 2019-10-11 RX ADMIN — MEROPENEM 1 G: 1 INJECTION, POWDER, FOR SOLUTION INTRAVENOUS at 00:19

## 2019-10-11 RX ADMIN — MEROPENEM 1 G: 1 INJECTION, POWDER, FOR SOLUTION INTRAVENOUS at 08:29

## 2019-10-11 RX ADMIN — LEVOTHYROXINE SODIUM 150 MCG: 0.15 TABLET ORAL at 05:38

## 2019-10-11 RX ADMIN — BUSPIRONE HYDROCHLORIDE 7.5 MG: 5 TABLET ORAL at 17:40

## 2019-10-11 RX ADMIN — SODIUM CHLORIDE: 9 INJECTION, SOLUTION INTRAVENOUS at 02:30

## 2019-10-11 RX ADMIN — DEXTROSE MONOHYDRATE 12.5 G: 500 INJECTION PARENTERAL at 05:43

## 2019-10-11 RX ADMIN — OXYBUTYNIN CHLORIDE 10 MG: 10 TABLET, EXTENDED RELEASE ORAL at 08:29

## 2019-10-11 RX ADMIN — PRAVASTATIN SODIUM 40 MG: 20 TABLET ORAL at 08:29

## 2019-10-11 RX ADMIN — BUSPIRONE HYDROCHLORIDE 7.5 MG: 5 TABLET ORAL at 08:30

## 2019-10-11 RX ADMIN — INSULIN LISPRO 1 UNITS: 100 INJECTION, SOLUTION INTRAVENOUS; SUBCUTANEOUS at 17:41

## 2019-10-11 RX ADMIN — ALLOPURINOL 300 MG: 300 TABLET ORAL at 08:30

## 2019-10-11 RX ADMIN — PANTOPRAZOLE SODIUM 40 MG: 40 TABLET, DELAYED RELEASE ORAL at 08:35

## 2019-10-11 RX ADMIN — BUMETANIDE 1 MG: 1 TABLET ORAL at 17:40

## 2019-10-11 RX ADMIN — GABAPENTIN 600 MG: 300 CAPSULE ORAL at 08:28

## 2019-10-14 LAB
BLOOD CULTURE, ROUTINE: NORMAL
CULTURE, BLOOD 2: NORMAL

## 2020-01-01 ENCOUNTER — HOSPITAL ENCOUNTER (INPATIENT)
Age: 80
LOS: 7 days | Discharge: HOME | DRG: 378 | End: 2020-07-28
Attending: EMERGENCY MEDICINE | Admitting: INTERNAL MEDICINE
Payer: MEDICARE

## 2020-01-01 ENCOUNTER — ANESTHESIA EVENT (OUTPATIENT)
Dept: ENDOSCOPY | Age: 80
DRG: 378 | End: 2020-01-01
Payer: MEDICARE

## 2020-01-01 ENCOUNTER — APPOINTMENT (OUTPATIENT)
Dept: GENERAL RADIOLOGY | Age: 80
DRG: 378 | End: 2020-01-01
Payer: MEDICARE

## 2020-01-01 ENCOUNTER — ANESTHESIA (OUTPATIENT)
Dept: ENDOSCOPY | Age: 80
DRG: 378 | End: 2020-01-01
Payer: MEDICARE

## 2020-01-01 ENCOUNTER — APPOINTMENT (OUTPATIENT)
Dept: CT IMAGING | Age: 80
DRG: 378 | End: 2020-01-01
Payer: MEDICARE

## 2020-01-01 VITALS
DIASTOLIC BLOOD PRESSURE: 53 MMHG | HEIGHT: 64 IN | WEIGHT: 250 LBS | HEART RATE: 83 BPM | OXYGEN SATURATION: 98 % | SYSTOLIC BLOOD PRESSURE: 125 MMHG | BODY MASS INDEX: 42.68 KG/M2 | RESPIRATION RATE: 19 BRPM | TEMPERATURE: 97.3 F

## 2020-01-01 VITALS
SYSTOLIC BLOOD PRESSURE: 155 MMHG | OXYGEN SATURATION: 98 % | RESPIRATION RATE: 21 BRPM | DIASTOLIC BLOOD PRESSURE: 65 MMHG

## 2020-01-01 VITALS
OXYGEN SATURATION: 97 % | RESPIRATION RATE: 11 BRPM | SYSTOLIC BLOOD PRESSURE: 87 MMHG | DIASTOLIC BLOOD PRESSURE: 48 MMHG

## 2020-01-01 VITALS
SYSTOLIC BLOOD PRESSURE: 103 MMHG | DIASTOLIC BLOOD PRESSURE: 70 MMHG | OXYGEN SATURATION: 96 % | RESPIRATION RATE: 15 BRPM

## 2020-01-01 LAB
ABO/RH: NORMAL
ANGLE (CLOT STRENGTH): 80.5 DEGREE (ref 59–74)
ANION GAP SERPL CALCULATED.3IONS-SCNC: 11 MMOL/L (ref 7–16)
ANION GAP SERPL CALCULATED.3IONS-SCNC: 11 MMOL/L (ref 7–16)
ANION GAP SERPL CALCULATED.3IONS-SCNC: 12 MMOL/L (ref 7–16)
ANION GAP SERPL CALCULATED.3IONS-SCNC: 6 MMOL/L (ref 7–16)
ANION GAP SERPL CALCULATED.3IONS-SCNC: 7 MMOL/L (ref 7–16)
ANION GAP SERPL CALCULATED.3IONS-SCNC: 9 MMOL/L (ref 7–16)
ANISOCYTOSIS: ABNORMAL
ANTIBODY SCREEN: NORMAL
ANTIBODY SCREEN: NORMAL
BASOPHILIC STIPPLING: ABNORMAL
BASOPHILS ABSOLUTE: 0 E9/L (ref 0–0.2)
BASOPHILS ABSOLUTE: 0.01 E9/L (ref 0–0.2)
BASOPHILS ABSOLUTE: 0.02 E9/L (ref 0–0.2)
BASOPHILS ABSOLUTE: 0.07 E9/L (ref 0–0.2)
BASOPHILS ABSOLUTE: 0.07 E9/L (ref 0–0.2)
BASOPHILS RELATIVE PERCENT: 0.1 % (ref 0–2)
BASOPHILS RELATIVE PERCENT: 0.1 % (ref 0–2)
BASOPHILS RELATIVE PERCENT: 0.2 % (ref 0–2)
BASOPHILS RELATIVE PERCENT: 0.3 % (ref 0–2)
BASOPHILS RELATIVE PERCENT: 0.4 % (ref 0–2)
BASOPHILS RELATIVE PERCENT: 0.9 % (ref 0–2)
BASOPHILS RELATIVE PERCENT: 0.9 % (ref 0–2)
BLOOD BANK DISPENSE STATUS: NORMAL
BLOOD BANK PRODUCT CODE: NORMAL
BPU ID: NORMAL
BUN BLDV-MCNC: 11 MG/DL (ref 8–23)
BUN BLDV-MCNC: 17 MG/DL (ref 8–23)
BUN BLDV-MCNC: 25 MG/DL (ref 8–23)
BUN BLDV-MCNC: 29 MG/DL (ref 8–23)
BUN BLDV-MCNC: 4 MG/DL (ref 8–23)
BUN BLDV-MCNC: 5 MG/DL (ref 8–23)
BUN BLDV-MCNC: 5 MG/DL (ref 8–23)
BUN BLDV-MCNC: 8 MG/DL (ref 8–23)
BURR CELLS: ABNORMAL
BURR CELLS: ABNORMAL
CALCIUM SERPL-MCNC: 8 MG/DL (ref 8.6–10.2)
CALCIUM SERPL-MCNC: 8.2 MG/DL (ref 8.6–10.2)
CALCIUM SERPL-MCNC: 8.3 MG/DL (ref 8.6–10.2)
CALCIUM SERPL-MCNC: 8.3 MG/DL (ref 8.6–10.2)
CALCIUM SERPL-MCNC: 8.6 MG/DL (ref 8.6–10.2)
CALCIUM SERPL-MCNC: 9 MG/DL (ref 8.6–10.2)
CHLORIDE BLD-SCNC: 101 MMOL/L (ref 98–107)
CHLORIDE BLD-SCNC: 102 MMOL/L (ref 98–107)
CHLORIDE BLD-SCNC: 102 MMOL/L (ref 98–107)
CHLORIDE BLD-SCNC: 105 MMOL/L (ref 98–107)
CHLORIDE BLD-SCNC: 106 MMOL/L (ref 98–107)
CHLORIDE BLD-SCNC: 98 MMOL/L (ref 98–107)
CO2: 25 MMOL/L (ref 22–29)
CO2: 27 MMOL/L (ref 22–29)
CO2: 29 MMOL/L (ref 22–29)
CO2: 29 MMOL/L (ref 22–29)
CO2: 30 MMOL/L (ref 22–29)
CO2: 32 MMOL/L (ref 22–29)
CREAT SERPL-MCNC: 0.6 MG/DL (ref 0.5–1)
CREAT SERPL-MCNC: 0.7 MG/DL (ref 0.5–1)
CREAT SERPL-MCNC: 0.8 MG/DL (ref 0.5–1)
CREAT SERPL-MCNC: 0.9 MG/DL (ref 0.5–1)
DESCRIPTION BLOOD BANK: NORMAL
EOSINOPHILS ABSOLUTE: 0.08 E9/L (ref 0.05–0.5)
EOSINOPHILS ABSOLUTE: 0.08 E9/L (ref 0.05–0.5)
EOSINOPHILS ABSOLUTE: 0.14 E9/L (ref 0.05–0.5)
EOSINOPHILS ABSOLUTE: 0.15 E9/L (ref 0.05–0.5)
EOSINOPHILS ABSOLUTE: 0.16 E9/L (ref 0.05–0.5)
EOSINOPHILS ABSOLUTE: 0.18 E9/L (ref 0.05–0.5)
EOSINOPHILS ABSOLUTE: 0.19 E9/L (ref 0.05–0.5)
EOSINOPHILS ABSOLUTE: 0.2 E9/L (ref 0.05–0.5)
EOSINOPHILS ABSOLUTE: 0.2 E9/L (ref 0.05–0.5)
EOSINOPHILS ABSOLUTE: 0.21 E9/L (ref 0.05–0.5)
EOSINOPHILS ABSOLUTE: 0.28 E9/L (ref 0.05–0.5)
EOSINOPHILS ABSOLUTE: 0.3 E9/L (ref 0.05–0.5)
EOSINOPHILS ABSOLUTE: 0.3 E9/L (ref 0.05–0.5)
EOSINOPHILS ABSOLUTE: 0.31 E9/L (ref 0.05–0.5)
EOSINOPHILS ABSOLUTE: 0.34 E9/L (ref 0.05–0.5)
EOSINOPHILS ABSOLUTE: 0.35 E9/L (ref 0.05–0.5)
EOSINOPHILS ABSOLUTE: 0.57 E9/L (ref 0.05–0.5)
EOSINOPHILS RELATIVE PERCENT: 0.9 % (ref 0–6)
EOSINOPHILS RELATIVE PERCENT: 0.9 % (ref 0–6)
EOSINOPHILS RELATIVE PERCENT: 1.4 % (ref 0–6)
EOSINOPHILS RELATIVE PERCENT: 1.7 % (ref 0–6)
EOSINOPHILS RELATIVE PERCENT: 1.7 % (ref 0–6)
EOSINOPHILS RELATIVE PERCENT: 1.8 % (ref 0–6)
EOSINOPHILS RELATIVE PERCENT: 2.2 % (ref 0–6)
EOSINOPHILS RELATIVE PERCENT: 2.6 % (ref 0–6)
EOSINOPHILS RELATIVE PERCENT: 2.6 % (ref 0–6)
EOSINOPHILS RELATIVE PERCENT: 3 % (ref 0–6)
EOSINOPHILS RELATIVE PERCENT: 3.2 % (ref 0–6)
EOSINOPHILS RELATIVE PERCENT: 3.5 % (ref 0–6)
EOSINOPHILS RELATIVE PERCENT: 3.6 % (ref 0–6)
EOSINOPHILS RELATIVE PERCENT: 3.8 % (ref 0–6)
EOSINOPHILS RELATIVE PERCENT: 4.3 % (ref 0–6)
EOSINOPHILS RELATIVE PERCENT: 5.2 % (ref 0–6)
EOSINOPHILS RELATIVE PERCENT: 5.3 % (ref 0–6)
EPL-TEG: 0.1 % (ref 0–15)
G-TEG: 14.8 K D/SC (ref 4.5–11)
GFR AFRICAN AMERICAN: >60
GFR NON-AFRICAN AMERICAN: >60 ML/MIN/1.73
GLUCOSE BLD-MCNC: 120 MG/DL (ref 74–99)
GLUCOSE BLD-MCNC: 161 MG/DL (ref 74–99)
GLUCOSE BLD-MCNC: 45 MG/DL (ref 74–99)
GLUCOSE BLD-MCNC: 50 MG/DL (ref 74–99)
GLUCOSE BLD-MCNC: 53 MG/DL (ref 74–99)
GLUCOSE BLD-MCNC: 56 MG/DL (ref 74–99)
GLUCOSE BLD-MCNC: 65 MG/DL (ref 74–99)
GLUCOSE BLD-MCNC: 95 MG/DL (ref 74–99)
HCT VFR BLD CALC: 21.9 % (ref 34–48)
HCT VFR BLD CALC: 23.2 % (ref 34–48)
HCT VFR BLD CALC: 23.3 % (ref 34–48)
HCT VFR BLD CALC: 23.6 % (ref 34–48)
HCT VFR BLD CALC: 24.2 % (ref 34–48)
HCT VFR BLD CALC: 24.6 % (ref 34–48)
HCT VFR BLD CALC: 25 % (ref 34–48)
HCT VFR BLD CALC: 25.5 % (ref 34–48)
HCT VFR BLD CALC: 25.9 % (ref 34–48)
HCT VFR BLD CALC: 26 % (ref 34–48)
HCT VFR BLD CALC: 26.6 % (ref 34–48)
HCT VFR BLD CALC: 26.7 % (ref 34–48)
HCT VFR BLD CALC: 27 % (ref 34–48)
HCT VFR BLD CALC: 27.1 % (ref 34–48)
HCT VFR BLD CALC: 27.4 % (ref 34–48)
HCT VFR BLD CALC: 29.1 % (ref 34–48)
HCT VFR BLD CALC: 29.5 % (ref 34–48)
HCT VFR BLD CALC: 29.6 % (ref 34–48)
HCT VFR BLD CALC: 30 % (ref 34–48)
HCT VFR BLD CALC: 30.7 % (ref 34–48)
HCT VFR BLD CALC: 30.9 % (ref 34–48)
HCT VFR BLD CALC: 31.1 % (ref 34–48)
HCT VFR BLD CALC: 31.4 % (ref 34–48)
HCT VFR BLD CALC: 32.3 % (ref 34–48)
HEMOGLOBIN: 5.7 G/DL (ref 11.5–15.5)
HEMOGLOBIN: 6.6 G/DL (ref 11.5–15.5)
HEMOGLOBIN: 6.6 G/DL (ref 11.5–15.5)
HEMOGLOBIN: 6.7 G/DL (ref 11.5–15.5)
HEMOGLOBIN: 6.9 G/DL (ref 11.5–15.5)
HEMOGLOBIN: 7 G/DL (ref 11.5–15.5)
HEMOGLOBIN: 7 G/DL (ref 11.5–15.5)
HEMOGLOBIN: 7.1 G/DL (ref 11.5–15.5)
HEMOGLOBIN: 7.3 G/DL (ref 11.5–15.5)
HEMOGLOBIN: 7.4 G/DL (ref 11.5–15.5)
HEMOGLOBIN: 7.5 G/DL (ref 11.5–15.5)
HEMOGLOBIN: 7.6 G/DL (ref 11.5–15.5)
HEMOGLOBIN: 7.7 G/DL (ref 11.5–15.5)
HEMOGLOBIN: 7.8 G/DL (ref 11.5–15.5)
HEMOGLOBIN: 7.8 G/DL (ref 11.5–15.5)
HEMOGLOBIN: 8.2 G/DL (ref 11.5–15.5)
HEMOGLOBIN: 8.3 G/DL (ref 11.5–15.5)
HEMOGLOBIN: 8.4 G/DL (ref 11.5–15.5)
HEMOGLOBIN: 8.6 G/DL (ref 11.5–15.5)
HEMOGLOBIN: 8.6 G/DL (ref 11.5–15.5)
HEMOGLOBIN: 8.7 G/DL (ref 11.5–15.5)
HEMOGLOBIN: 8.9 G/DL (ref 11.5–15.5)
HEMOGLOBIN: 8.9 G/DL (ref 11.5–15.5)
HEMOGLOBIN: 9 G/DL (ref 11.5–15.5)
HYPOCHROMIA: ABNORMAL
IMMATURE GRANULOCYTES #: 0.03 E9/L
IMMATURE GRANULOCYTES #: 0.03 E9/L
IMMATURE GRANULOCYTES #: 0.04 E9/L
IMMATURE GRANULOCYTES #: 0.05 E9/L
IMMATURE GRANULOCYTES #: 0.06 E9/L
IMMATURE GRANULOCYTES #: 0.06 E9/L
IMMATURE GRANULOCYTES #: 0.07 E9/L
IMMATURE GRANULOCYTES #: 0.08 E9/L
IMMATURE GRANULOCYTES %: 0.4 % (ref 0–5)
IMMATURE GRANULOCYTES %: 0.5 % (ref 0–5)
IMMATURE GRANULOCYTES %: 0.5 % (ref 0–5)
IMMATURE GRANULOCYTES %: 0.6 % (ref 0–5)
IMMATURE GRANULOCYTES %: 0.7 % (ref 0–5)
IMMATURE GRANULOCYTES %: 0.8 % (ref 0–5)
IMMATURE GRANULOCYTES %: 0.8 % (ref 0–5)
IRON SATURATION: 7 % (ref 15–50)
IRON: 22 MCG/DL (ref 37–145)
K (CLOTTING TIME): 0.8 MIN (ref 1–3)
LACTIC ACID: 4.2 MMOL/L (ref 0.5–2.2)
LY30 (FIBRINOLYSIS): 0.1 % (ref 0–8)
LYMPHOCYTES ABSOLUTE: 0.4 E9/L (ref 1.5–4)
LYMPHOCYTES ABSOLUTE: 0.53 E9/L (ref 1.5–4)
LYMPHOCYTES ABSOLUTE: 0.72 E9/L (ref 1.5–4)
LYMPHOCYTES ABSOLUTE: 0.78 E9/L (ref 1.5–4)
LYMPHOCYTES ABSOLUTE: 0.88 E9/L (ref 1.5–4)
LYMPHOCYTES ABSOLUTE: 0.91 E9/L (ref 1.5–4)
LYMPHOCYTES ABSOLUTE: 0.96 E9/L (ref 1.5–4)
LYMPHOCYTES ABSOLUTE: 0.96 E9/L (ref 1.5–4)
LYMPHOCYTES ABSOLUTE: 1.01 E9/L (ref 1.5–4)
LYMPHOCYTES ABSOLUTE: 1.05 E9/L (ref 1.5–4)
LYMPHOCYTES ABSOLUTE: 1.05 E9/L (ref 1.5–4)
LYMPHOCYTES ABSOLUTE: 1.07 E9/L (ref 1.5–4)
LYMPHOCYTES ABSOLUTE: 1.1 E9/L (ref 1.5–4)
LYMPHOCYTES ABSOLUTE: 1.17 E9/L (ref 1.5–4)
LYMPHOCYTES ABSOLUTE: 1.35 E9/L (ref 1.5–4)
LYMPHOCYTES ABSOLUTE: 1.38 E9/L (ref 1.5–4)
LYMPHOCYTES ABSOLUTE: 1.38 E9/L (ref 1.5–4)
LYMPHOCYTES RELATIVE PERCENT: 10.4 % (ref 20–42)
LYMPHOCYTES RELATIVE PERCENT: 10.6 % (ref 20–42)
LYMPHOCYTES RELATIVE PERCENT: 11.8 % (ref 20–42)
LYMPHOCYTES RELATIVE PERCENT: 12.9 % (ref 20–42)
LYMPHOCYTES RELATIVE PERCENT: 13 % (ref 20–42)
LYMPHOCYTES RELATIVE PERCENT: 13 % (ref 20–42)
LYMPHOCYTES RELATIVE PERCENT: 14.6 % (ref 20–42)
LYMPHOCYTES RELATIVE PERCENT: 16.3 % (ref 20–42)
LYMPHOCYTES RELATIVE PERCENT: 16.4 % (ref 20–42)
LYMPHOCYTES RELATIVE PERCENT: 16.5 % (ref 20–42)
LYMPHOCYTES RELATIVE PERCENT: 16.9 % (ref 20–42)
LYMPHOCYTES RELATIVE PERCENT: 5.3 % (ref 20–42)
LYMPHOCYTES RELATIVE PERCENT: 6.1 % (ref 20–42)
LYMPHOCYTES RELATIVE PERCENT: 6.9 % (ref 20–42)
LYMPHOCYTES RELATIVE PERCENT: 7.8 % (ref 20–42)
LYMPHOCYTES RELATIVE PERCENT: 8.8 % (ref 20–42)
LYMPHOCYTES RELATIVE PERCENT: 9.9 % (ref 20–42)
MA (MAX AMPLITUDE): 74.7 MM (ref 50–70)
MCH RBC QN AUTO: 17.8 PG (ref 26–35)
MCH RBC QN AUTO: 21.1 PG (ref 26–35)
MCH RBC QN AUTO: 21.2 PG (ref 26–35)
MCH RBC QN AUTO: 21.3 PG (ref 26–35)
MCH RBC QN AUTO: 21.6 PG (ref 26–35)
MCH RBC QN AUTO: 21.6 PG (ref 26–35)
MCH RBC QN AUTO: 22 PG (ref 26–35)
MCH RBC QN AUTO: 22.1 PG (ref 26–35)
MCH RBC QN AUTO: 22.3 PG (ref 26–35)
MCH RBC QN AUTO: 22.3 PG (ref 26–35)
MCH RBC QN AUTO: 22.4 PG (ref 26–35)
MCH RBC QN AUTO: 22.8 PG (ref 26–35)
MCHC RBC AUTO-ENTMCNC: 26 % (ref 32–34.5)
MCHC RBC AUTO-ENTMCNC: 27.6 % (ref 32–34.5)
MCHC RBC AUTO-ENTMCNC: 27.7 % (ref 32–34.5)
MCHC RBC AUTO-ENTMCNC: 27.8 % (ref 32–34.5)
MCHC RBC AUTO-ENTMCNC: 28 % (ref 32–34.5)
MCHC RBC AUTO-ENTMCNC: 28.2 % (ref 32–34.5)
MCHC RBC AUTO-ENTMCNC: 28.3 % (ref 32–34.5)
MCHC RBC AUTO-ENTMCNC: 28.4 % (ref 32–34.5)
MCHC RBC AUTO-ENTMCNC: 28.5 % (ref 32–34.5)
MCHC RBC AUTO-ENTMCNC: 28.7 % (ref 32–34.5)
MCHC RBC AUTO-ENTMCNC: 28.8 % (ref 32–34.5)
MCHC RBC AUTO-ENTMCNC: 29.2 % (ref 32–34.5)
MCV RBC AUTO: 68.2 FL (ref 80–99.9)
MCV RBC AUTO: 74.7 FL (ref 80–99.9)
MCV RBC AUTO: 74.8 FL (ref 80–99.9)
MCV RBC AUTO: 74.9 FL (ref 80–99.9)
MCV RBC AUTO: 75.6 FL (ref 80–99.9)
MCV RBC AUTO: 75.8 FL (ref 80–99.9)
MCV RBC AUTO: 75.9 FL (ref 80–99.9)
MCV RBC AUTO: 76.2 FL (ref 80–99.9)
MCV RBC AUTO: 77.4 FL (ref 80–99.9)
MCV RBC AUTO: 77.6 FL (ref 80–99.9)
MCV RBC AUTO: 77.7 FL (ref 80–99.9)
MCV RBC AUTO: 78.1 FL (ref 80–99.9)
MCV RBC AUTO: 78.2 FL (ref 80–99.9)
MCV RBC AUTO: 78.5 FL (ref 80–99.9)
MCV RBC AUTO: 78.7 FL (ref 80–99.9)
MCV RBC AUTO: 79.1 FL (ref 80–99.9)
MCV RBC AUTO: 79.6 FL (ref 80–99.9)
MCV RBC AUTO: 79.8 FL (ref 80–99.9)
METER GLUCOSE: 110 MG/DL (ref 74–99)
METER GLUCOSE: 111 MG/DL (ref 74–99)
METER GLUCOSE: 126 MG/DL (ref 74–99)
METER GLUCOSE: 138 MG/DL (ref 74–99)
METER GLUCOSE: 142 MG/DL (ref 74–99)
METER GLUCOSE: 161 MG/DL (ref 74–99)
METER GLUCOSE: 173 MG/DL (ref 74–99)
METER GLUCOSE: 207 MG/DL (ref 74–99)
METER GLUCOSE: 224 MG/DL (ref 74–99)
METER GLUCOSE: 55 MG/DL (ref 74–99)
METER GLUCOSE: 57 MG/DL (ref 74–99)
METER GLUCOSE: 62 MG/DL (ref 74–99)
METER GLUCOSE: 77 MG/DL (ref 74–99)
METER GLUCOSE: 77 MG/DL (ref 74–99)
METER GLUCOSE: 78 MG/DL (ref 74–99)
METER GLUCOSE: 83 MG/DL (ref 74–99)
METER GLUCOSE: 83 MG/DL (ref 74–99)
METER GLUCOSE: 97 MG/DL (ref 74–99)
METER GLUCOSE: 99 MG/DL (ref 74–99)
MONOCYTES ABSOLUTE: 0.32 E9/L (ref 0.1–0.95)
MONOCYTES ABSOLUTE: 0.36 E9/L (ref 0.1–0.95)
MONOCYTES ABSOLUTE: 0.36 E9/L (ref 0.1–0.95)
MONOCYTES ABSOLUTE: 0.47 E9/L (ref 0.1–0.95)
MONOCYTES ABSOLUTE: 0.53 E9/L (ref 0.1–0.95)
MONOCYTES ABSOLUTE: 0.73 E9/L (ref 0.1–0.95)
MONOCYTES ABSOLUTE: 0.77 E9/L (ref 0.1–0.95)
MONOCYTES ABSOLUTE: 0.82 E9/L (ref 0.1–0.95)
MONOCYTES ABSOLUTE: 0.82 E9/L (ref 0.1–0.95)
MONOCYTES ABSOLUTE: 0.84 E9/L (ref 0.1–0.95)
MONOCYTES ABSOLUTE: 0.89 E9/L (ref 0.1–0.95)
MONOCYTES ABSOLUTE: 0.91 E9/L (ref 0.1–0.95)
MONOCYTES ABSOLUTE: 0.92 E9/L (ref 0.1–0.95)
MONOCYTES ABSOLUTE: 0.93 E9/L (ref 0.1–0.95)
MONOCYTES ABSOLUTE: 0.96 E9/L (ref 0.1–0.95)
MONOCYTES ABSOLUTE: 0.99 E9/L (ref 0.1–0.95)
MONOCYTES ABSOLUTE: 1.02 E9/L (ref 0.1–0.95)
MONOCYTES RELATIVE PERCENT: 10.3 % (ref 2–12)
MONOCYTES RELATIVE PERCENT: 10.4 % (ref 2–12)
MONOCYTES RELATIVE PERCENT: 10.5 % (ref 2–12)
MONOCYTES RELATIVE PERCENT: 10.9 % (ref 2–12)
MONOCYTES RELATIVE PERCENT: 11.1 % (ref 2–12)
MONOCYTES RELATIVE PERCENT: 11.7 % (ref 2–12)
MONOCYTES RELATIVE PERCENT: 11.8 % (ref 2–12)
MONOCYTES RELATIVE PERCENT: 13.1 % (ref 2–12)
MONOCYTES RELATIVE PERCENT: 3.5 % (ref 2–12)
MONOCYTES RELATIVE PERCENT: 6.1 % (ref 2–12)
MONOCYTES RELATIVE PERCENT: 6.1 % (ref 2–12)
MONOCYTES RELATIVE PERCENT: 7.3 % (ref 2–12)
MONOCYTES RELATIVE PERCENT: 8.7 % (ref 2–12)
MONOCYTES RELATIVE PERCENT: 8.7 % (ref 2–12)
MONOCYTES RELATIVE PERCENT: 9.4 % (ref 2–12)
NEUTROPHILS ABSOLUTE: 10.59 E9/L (ref 1.8–7.3)
NEUTROPHILS ABSOLUTE: 4.15 E9/L (ref 1.8–7.3)
NEUTROPHILS ABSOLUTE: 4.33 E9/L (ref 1.8–7.3)
NEUTROPHILS ABSOLUTE: 5.44 E9/L (ref 1.8–7.3)
NEUTROPHILS ABSOLUTE: 5.7 E9/L (ref 1.8–7.3)
NEUTROPHILS ABSOLUTE: 5.77 E9/L (ref 1.8–7.3)
NEUTROPHILS ABSOLUTE: 5.84 E9/L (ref 1.8–7.3)
NEUTROPHILS ABSOLUTE: 5.99 E9/L (ref 1.8–7.3)
NEUTROPHILS ABSOLUTE: 6.08 E9/L (ref 1.8–7.3)
NEUTROPHILS ABSOLUTE: 6.87 E9/L (ref 1.8–7.3)
NEUTROPHILS ABSOLUTE: 7.02 E9/L (ref 1.8–7.3)
NEUTROPHILS ABSOLUTE: 7.38 E9/L (ref 1.8–7.3)
NEUTROPHILS ABSOLUTE: 7.4 E9/L (ref 1.8–7.3)
NEUTROPHILS ABSOLUTE: 7.55 E9/L (ref 1.8–7.3)
NEUTROPHILS ABSOLUTE: 7.66 E9/L (ref 1.8–7.3)
NEUTROPHILS ABSOLUTE: 7.92 E9/L (ref 1.8–7.3)
NEUTROPHILS ABSOLUTE: 8.36 E9/L (ref 1.8–7.3)
NEUTROPHILS RELATIVE PERCENT: 64.5 % (ref 43–80)
NEUTROPHILS RELATIVE PERCENT: 67.9 % (ref 43–80)
NEUTROPHILS RELATIVE PERCENT: 68.4 % (ref 43–80)
NEUTROPHILS RELATIVE PERCENT: 69.5 % (ref 43–80)
NEUTROPHILS RELATIVE PERCENT: 71.9 % (ref 43–80)
NEUTROPHILS RELATIVE PERCENT: 73 % (ref 43–80)
NEUTROPHILS RELATIVE PERCENT: 73.1 % (ref 43–80)
NEUTROPHILS RELATIVE PERCENT: 74.8 % (ref 43–80)
NEUTROPHILS RELATIVE PERCENT: 75.1 % (ref 43–80)
NEUTROPHILS RELATIVE PERCENT: 75.7 % (ref 43–80)
NEUTROPHILS RELATIVE PERCENT: 78.2 % (ref 43–80)
NEUTROPHILS RELATIVE PERCENT: 79.5 % (ref 43–80)
NEUTROPHILS RELATIVE PERCENT: 81.7 % (ref 43–80)
NEUTROPHILS RELATIVE PERCENT: 83.6 % (ref 43–80)
NEUTROPHILS RELATIVE PERCENT: 86.8 % (ref 43–80)
NEUTROPHILS RELATIVE PERCENT: 86.8 % (ref 43–80)
NEUTROPHILS RELATIVE PERCENT: 87.8 % (ref 43–80)
OVALOCYTES: ABNORMAL
PDW BLD-RTO: 17 FL (ref 11.5–15)
PDW BLD-RTO: 24.3 FL (ref 11.5–15)
PDW BLD-RTO: 24.5 FL (ref 11.5–15)
PDW BLD-RTO: 24.8 FL (ref 11.5–15)
PDW BLD-RTO: 25 FL (ref 11.5–15)
PDW BLD-RTO: 25.1 FL (ref 11.5–15)
PDW BLD-RTO: 25.2 FL (ref 11.5–15)
PDW BLD-RTO: 25.2 FL (ref 11.5–15)
PDW BLD-RTO: 25.8 FL (ref 11.5–15)
PDW BLD-RTO: 25.9 FL (ref 11.5–15)
PDW BLD-RTO: 25.9 FL (ref 11.5–15)
PDW BLD-RTO: 26 FL (ref 11.5–15)
PDW BLD-RTO: 26.3 FL (ref 11.5–15)
PDW BLD-RTO: 26.4 FL (ref 11.5–15)
PDW BLD-RTO: 26.9 FL (ref 11.5–15)
PDW BLD-RTO: 27.1 FL (ref 11.5–15)
PLATELET # BLD: 297 E9/L (ref 130–450)
PLATELET # BLD: 314 E9/L (ref 130–450)
PLATELET # BLD: 322 E9/L (ref 130–450)
PLATELET # BLD: 325 E9/L (ref 130–450)
PLATELET # BLD: 330 E9/L (ref 130–450)
PLATELET # BLD: 334 E9/L (ref 130–450)
PLATELET # BLD: 335 E9/L (ref 130–450)
PLATELET # BLD: 338 E9/L (ref 130–450)
PLATELET # BLD: 341 E9/L (ref 130–450)
PLATELET # BLD: 350 E9/L (ref 130–450)
PLATELET # BLD: 355 E9/L (ref 130–450)
PLATELET # BLD: 357 E9/L (ref 130–450)
PLATELET # BLD: 361 E9/L (ref 130–450)
PLATELET # BLD: 362 E9/L (ref 130–450)
PLATELET # BLD: 371 E9/L (ref 130–450)
PLATELET # BLD: 373 E9/L (ref 130–450)
PLATELET # BLD: 395 E9/L (ref 130–450)
PLATELET # BLD: 494 E9/L (ref 130–450)
PMV BLD AUTO: 10 FL (ref 7–12)
PMV BLD AUTO: 10 FL (ref 7–12)
PMV BLD AUTO: 9 FL (ref 7–12)
PMV BLD AUTO: 9.1 FL (ref 7–12)
PMV BLD AUTO: 9.1 FL (ref 7–12)
PMV BLD AUTO: 9.2 FL (ref 7–12)
PMV BLD AUTO: 9.4 FL (ref 7–12)
PMV BLD AUTO: 9.5 FL (ref 7–12)
PMV BLD AUTO: 9.5 FL (ref 7–12)
PMV BLD AUTO: 9.6 FL (ref 7–12)
PMV BLD AUTO: 9.7 FL (ref 7–12)
PMV BLD AUTO: 9.7 FL (ref 7–12)
PMV BLD AUTO: 9.9 FL (ref 7–12)
PMV BLD AUTO: 9.9 FL (ref 7–12)
POIKILOCYTES: ABNORMAL
POLYCHROMASIA: ABNORMAL
POTASSIUM REFLEX MAGNESIUM: 4.3 MMOL/L (ref 3.5–5)
POTASSIUM SERPL-SCNC: 3.3 MMOL/L (ref 3.5–5)
POTASSIUM SERPL-SCNC: 3.4 MMOL/L (ref 3.5–5)
POTASSIUM SERPL-SCNC: 3.8 MMOL/L (ref 3.5–5)
POTASSIUM SERPL-SCNC: 4.1 MMOL/L (ref 3.5–5)
POTASSIUM SERPL-SCNC: 4.2 MMOL/L (ref 3.5–5)
POTASSIUM SERPL-SCNC: 4.5 MMOL/L (ref 3.5–5)
POTASSIUM SERPL-SCNC: 4.6 MMOL/L (ref 3.5–5)
R (REACTION TIME): 4 MIN (ref 5–10)
RBC # BLD: 3.1 E12/L (ref 3.5–5.5)
RBC # BLD: 3.12 E12/L (ref 3.5–5.5)
RBC # BLD: 3.15 E12/L (ref 3.5–5.5)
RBC # BLD: 3.2 E12/L (ref 3.5–5.5)
RBC # BLD: 3.21 E12/L (ref 3.5–5.5)
RBC # BLD: 3.28 E12/L (ref 3.5–5.5)
RBC # BLD: 3.36 E12/L (ref 3.5–5.5)
RBC # BLD: 3.43 E12/L (ref 3.5–5.5)
RBC # BLD: 3.53 E12/L (ref 3.5–5.5)
RBC # BLD: 3.68 E12/L (ref 3.5–5.5)
RBC # BLD: 3.77 E12/L (ref 3.5–5.5)
RBC # BLD: 3.77 E12/L (ref 3.5–5.5)
RBC # BLD: 3.81 E12/L (ref 3.5–5.5)
RBC # BLD: 3.91 E12/L (ref 3.5–5.5)
RBC # BLD: 3.95 E12/L (ref 3.5–5.5)
RBC # BLD: 3.99 E12/L (ref 3.5–5.5)
RBC # BLD: 4.02 E12/L (ref 3.5–5.5)
RBC # BLD: 4.05 E12/L (ref 3.5–5.5)
SCHISTOCYTES: ABNORMAL
SODIUM BLD-SCNC: 139 MMOL/L (ref 132–146)
SODIUM BLD-SCNC: 140 MMOL/L (ref 132–146)
SODIUM BLD-SCNC: 140 MMOL/L (ref 132–146)
SODIUM BLD-SCNC: 141 MMOL/L (ref 132–146)
SODIUM BLD-SCNC: 141 MMOL/L (ref 132–146)
SODIUM BLD-SCNC: 142 MMOL/L (ref 132–146)
SODIUM BLD-SCNC: 143 MMOL/L (ref 132–146)
SODIUM BLD-SCNC: 144 MMOL/L (ref 132–146)
SPHEROCYTES: ABNORMAL
TARGET CELLS: ABNORMAL
TEAR DROP CELLS: ABNORMAL
TOTAL IRON BINDING CAPACITY: 306 MCG/DL (ref 250–450)
TOXIC GRANULATION: ABNORMAL
TSH SERPL DL<=0.05 MIU/L-ACNC: 0.45 UIU/ML (ref 0.27–4.2)
WBC # BLD: 11 E9/L (ref 4.5–11.5)
WBC # BLD: 12.7 E9/L (ref 4.5–11.5)
WBC # BLD: 14.6 E9/L (ref 4.5–11.5)
WBC # BLD: 6.2 E9/L (ref 4.5–11.5)
WBC # BLD: 6.4 E9/L (ref 4.5–11.5)
WBC # BLD: 7.8 E9/L (ref 4.5–11.5)
WBC # BLD: 7.9 E9/L (ref 4.5–11.5)
WBC # BLD: 8 E9/L (ref 4.5–11.5)
WBC # BLD: 8.1 E9/L (ref 4.5–11.5)
WBC # BLD: 8.1 E9/L (ref 4.5–11.5)
WBC # BLD: 8.2 E9/L (ref 4.5–11.5)
WBC # BLD: 8.4 E9/L (ref 4.5–11.5)
WBC # BLD: 8.8 E9/L (ref 4.5–11.5)
WBC # BLD: 8.8 E9/L (ref 4.5–11.5)
WBC # BLD: 9 E9/L (ref 4.5–11.5)
WBC # BLD: 9 E9/L (ref 4.5–11.5)
WBC # BLD: 9.7 E9/L (ref 4.5–11.5)
WBC # BLD: 9.9 E9/L (ref 4.5–11.5)

## 2020-01-01 PROCEDURE — 7100000001 HC PACU RECOVERY - ADDTL 15 MIN: Performed by: SURGERY

## 2020-01-01 PROCEDURE — 85014 HEMATOCRIT: CPT

## 2020-01-01 PROCEDURE — 2580000003 HC RX 258

## 2020-01-01 PROCEDURE — 2580000003 HC RX 258: Performed by: ANESTHESIOLOGY

## 2020-01-01 PROCEDURE — 6370000000 HC RX 637 (ALT 250 FOR IP): Performed by: STUDENT IN AN ORGANIZED HEALTH CARE EDUCATION/TRAINING PROGRAM

## 2020-01-01 PROCEDURE — 2580000003 HC RX 258: Performed by: SURGERY

## 2020-01-01 PROCEDURE — 85025 COMPLETE CBC W/AUTO DIFF WBC: CPT

## 2020-01-01 PROCEDURE — 6370000000 HC RX 637 (ALT 250 FOR IP): Performed by: SURGERY

## 2020-01-01 PROCEDURE — 2580000003 HC RX 258: Performed by: EMERGENCY MEDICINE

## 2020-01-01 PROCEDURE — 2700000000 HC OXYGEN THERAPY PER DAY

## 2020-01-01 PROCEDURE — 7100000000 HC PACU RECOVERY - FIRST 15 MIN: Performed by: SURGERY

## 2020-01-01 PROCEDURE — 36430 TRANSFUSION BLD/BLD COMPNT: CPT

## 2020-01-01 PROCEDURE — 82962 GLUCOSE BLOOD TEST: CPT

## 2020-01-01 PROCEDURE — 36415 COLL VENOUS BLD VENIPUNCTURE: CPT

## 2020-01-01 PROCEDURE — 2060000000 HC ICU INTERMEDIATE R&B

## 2020-01-01 PROCEDURE — 99232 SBSQ HOSP IP/OBS MODERATE 35: CPT | Performed by: SURGERY

## 2020-01-01 PROCEDURE — 0DJD8ZZ INSPECTION OF LOWER INTESTINAL TRACT, VIA NATURAL OR ARTIFICIAL OPENING ENDOSCOPIC: ICD-10-PCS | Performed by: SURGERY

## 2020-01-01 PROCEDURE — 96365 THER/PROPH/DIAG IV INF INIT: CPT

## 2020-01-01 PROCEDURE — 84443 ASSAY THYROID STIM HORMONE: CPT

## 2020-01-01 PROCEDURE — 6360000002 HC RX W HCPCS: Performed by: EMERGENCY MEDICINE

## 2020-01-01 PROCEDURE — 2709999900 HC NON-CHARGEABLE SUPPLY: Performed by: SURGERY

## 2020-01-01 PROCEDURE — 6370000000 HC RX 637 (ALT 250 FOR IP): Performed by: INTERNAL MEDICINE

## 2020-01-01 PROCEDURE — 80048 BASIC METABOLIC PNL TOTAL CA: CPT

## 2020-01-01 PROCEDURE — 83550 IRON BINDING TEST: CPT

## 2020-01-01 PROCEDURE — 45330 DIAGNOSTIC SIGMOIDOSCOPY: CPT | Performed by: SURGERY

## 2020-01-01 PROCEDURE — 2580000003 HC RX 258: Performed by: RADIOLOGY

## 2020-01-01 PROCEDURE — 85027 COMPLETE CBC AUTOMATED: CPT

## 2020-01-01 PROCEDURE — 6360000002 HC RX W HCPCS: Performed by: SURGERY

## 2020-01-01 PROCEDURE — 86850 RBC ANTIBODY SCREEN: CPT

## 2020-01-01 PROCEDURE — C9113 INJ PANTOPRAZOLE SODIUM, VIA: HCPCS | Performed by: SURGERY

## 2020-01-01 PROCEDURE — 99221 1ST HOSP IP/OBS SF/LOW 40: CPT | Performed by: SURGERY

## 2020-01-01 PROCEDURE — 3700000001 HC ADD 15 MINUTES (ANESTHESIA): Performed by: SURGERY

## 2020-01-01 PROCEDURE — 86900 BLOOD TYPING SEROLOGIC ABO: CPT

## 2020-01-01 PROCEDURE — 74270 X-RAY XM COLON 1CNTRST STD: CPT

## 2020-01-01 PROCEDURE — 3609027000 HC COLONOSCOPY: Performed by: SURGERY

## 2020-01-01 PROCEDURE — 43235 EGD DIAGNOSTIC BRUSH WASH: CPT | Performed by: SURGERY

## 2020-01-01 PROCEDURE — 2500000003 HC RX 250 WO HCPCS: Performed by: EMERGENCY MEDICINE

## 2020-01-01 PROCEDURE — 2580000003 HC RX 258: Performed by: NURSE ANESTHETIST, CERTIFIED REGISTERED

## 2020-01-01 PROCEDURE — 85384 FIBRINOGEN ACTIVITY: CPT

## 2020-01-01 PROCEDURE — 6360000002 HC RX W HCPCS: Performed by: STUDENT IN AN ORGANIZED HEALTH CARE EDUCATION/TRAINING PROGRAM

## 2020-01-01 PROCEDURE — C9113 INJ PANTOPRAZOLE SODIUM, VIA: HCPCS | Performed by: STUDENT IN AN ORGANIZED HEALTH CARE EDUCATION/TRAINING PROGRAM

## 2020-01-01 PROCEDURE — C9113 INJ PANTOPRAZOLE SODIUM, VIA: HCPCS | Performed by: EMERGENCY MEDICINE

## 2020-01-01 PROCEDURE — 6360000004 HC RX CONTRAST MEDICATION: Performed by: RADIOLOGY

## 2020-01-01 PROCEDURE — 74177 CT ABD & PELVIS W/CONTRAST: CPT

## 2020-01-01 PROCEDURE — 85347 COAGULATION TIME ACTIVATED: CPT

## 2020-01-01 PROCEDURE — 3609155600 HC COLONOSCOPY WITH DECOMPRESSION: Performed by: SURGERY

## 2020-01-01 PROCEDURE — 6360000004 HC RX CONTRAST MEDICATION: Performed by: STUDENT IN AN ORGANIZED HEALTH CARE EDUCATION/TRAINING PROGRAM

## 2020-01-01 PROCEDURE — 71045 X-RAY EXAM CHEST 1 VIEW: CPT

## 2020-01-01 PROCEDURE — 2580000003 HC RX 258: Performed by: INTERNAL MEDICINE

## 2020-01-01 PROCEDURE — 86922 COMPATIBILITY TEST ANTIGLOB: CPT

## 2020-01-01 PROCEDURE — P9016 RBC LEUKOCYTES REDUCED: HCPCS

## 2020-01-01 PROCEDURE — 0DJ08ZZ INSPECTION OF UPPER INTESTINAL TRACT, VIA NATURAL OR ARTIFICIAL OPENING ENDOSCOPIC: ICD-10-PCS | Performed by: STUDENT IN AN ORGANIZED HEALTH CARE EDUCATION/TRAINING PROGRAM

## 2020-01-01 PROCEDURE — 6360000002 HC RX W HCPCS: Performed by: NURSE ANESTHETIST, CERTIFIED REGISTERED

## 2020-01-01 PROCEDURE — 3700000000 HC ANESTHESIA ATTENDED CARE: Performed by: SURGERY

## 2020-01-01 PROCEDURE — 6360000002 HC RX W HCPCS

## 2020-01-01 PROCEDURE — 2580000003 HC RX 258: Performed by: STUDENT IN AN ORGANIZED HEALTH CARE EDUCATION/TRAINING PROGRAM

## 2020-01-01 PROCEDURE — 74018 RADEX ABDOMEN 1 VIEW: CPT

## 2020-01-01 PROCEDURE — 99285 EMERGENCY DEPT VISIT HI MDM: CPT

## 2020-01-01 PROCEDURE — 86901 BLOOD TYPING SEROLOGIC RH(D): CPT

## 2020-01-01 PROCEDURE — 83540 ASSAY OF IRON: CPT

## 2020-01-01 PROCEDURE — 3609017100 HC EGD: Performed by: SURGERY

## 2020-01-01 PROCEDURE — 99024 POSTOP FOLLOW-UP VISIT: CPT | Performed by: SURGERY

## 2020-01-01 PROCEDURE — 85576 BLOOD PLATELET AGGREGATION: CPT

## 2020-01-01 PROCEDURE — 83605 ASSAY OF LACTIC ACID: CPT

## 2020-01-01 PROCEDURE — 85018 HEMOGLOBIN: CPT

## 2020-01-01 PROCEDURE — 86902 BLOOD TYPE ANTIGEN DONOR EA: CPT

## 2020-01-01 PROCEDURE — 86905 BLOOD TYPING RBC ANTIGENS: CPT

## 2020-01-01 RX ORDER — DEXTROSE MONOHYDRATE 25 G/50ML
INJECTION, SOLUTION INTRAVENOUS
Status: COMPLETED
Start: 2020-01-01 | End: 2020-01-01

## 2020-01-01 RX ORDER — SODIUM CHLORIDE, SODIUM LACTATE, POTASSIUM CHLORIDE, CALCIUM CHLORIDE 600; 310; 30; 20 MG/100ML; MG/100ML; MG/100ML; MG/100ML
INJECTION, SOLUTION INTRAVENOUS CONTINUOUS
Status: DISCONTINUED | OUTPATIENT
Start: 2020-01-01 | End: 2020-01-01

## 2020-01-01 RX ORDER — SENNA PLUS 8.6 MG/1
2 TABLET ORAL 2 TIMES DAILY
Qty: 120 TABLET | Refills: 0 | Status: SHIPPED | OUTPATIENT
Start: 2020-01-01 | End: 2020-01-01

## 2020-01-01 RX ORDER — GABAPENTIN 600 MG/1
600 TABLET ORAL 2 TIMES DAILY
Status: DISCONTINUED | OUTPATIENT
Start: 2020-01-01 | End: 2020-01-01 | Stop reason: HOSPADM

## 2020-01-01 RX ORDER — GABAPENTIN 600 MG/1
600 TABLET ORAL 2 TIMES DAILY
COMMUNITY

## 2020-01-01 RX ORDER — POTASSIUM CHLORIDE 20 MEQ/1
40 TABLET, EXTENDED RELEASE ORAL DAILY
Status: DISCONTINUED | OUTPATIENT
Start: 2020-01-01 | End: 2020-01-01 | Stop reason: HOSPADM

## 2020-01-01 RX ORDER — PANTOPRAZOLE SODIUM 40 MG/1
40 TABLET, DELAYED RELEASE ORAL
Qty: 60 TABLET | Refills: 0 | Status: SHIPPED | OUTPATIENT
Start: 2020-01-01 | End: 2021-01-01

## 2020-01-01 RX ORDER — SODIUM CHLORIDE 0.9 % (FLUSH) 0.9 %
10 SYRINGE (ML) INJECTION PRN
Status: DISCONTINUED | OUTPATIENT
Start: 2020-01-01 | End: 2020-01-01 | Stop reason: HOSPADM

## 2020-01-01 RX ORDER — SENNA PLUS 8.6 MG/1
1 TABLET ORAL NIGHTLY
Status: DISCONTINUED | OUTPATIENT
Start: 2020-01-01 | End: 2020-01-01

## 2020-01-01 RX ORDER — POLYETHYLENE GLYCOL 3350 17 G/17G
500 POWDER, FOR SOLUTION ORAL ONCE
Status: COMPLETED | OUTPATIENT
Start: 2020-01-01 | End: 2020-01-01

## 2020-01-01 RX ORDER — SUCRALFATE 1 G/1
1 TABLET ORAL 3 TIMES DAILY
COMMUNITY

## 2020-01-01 RX ORDER — SODIUM CHLORIDE 9 MG/ML
INJECTION, SOLUTION INTRAVENOUS CONTINUOUS PRN
Status: DISCONTINUED | OUTPATIENT
Start: 2020-01-01 | End: 2020-01-01 | Stop reason: SDUPTHER

## 2020-01-01 RX ORDER — ALLOPURINOL 300 MG/1
300 TABLET ORAL DAILY
Status: DISCONTINUED | OUTPATIENT
Start: 2020-01-01 | End: 2020-01-01 | Stop reason: HOSPADM

## 2020-01-01 RX ORDER — ACETAMINOPHEN 325 MG/1
650 TABLET ORAL EVERY 6 HOURS PRN
Status: DISCONTINUED | OUTPATIENT
Start: 2020-01-01 | End: 2020-01-01 | Stop reason: HOSPADM

## 2020-01-01 RX ORDER — SODIUM CHLORIDE 9 MG/ML
10 INJECTION INTRAVENOUS EVERY 12 HOURS
Status: DISCONTINUED | OUTPATIENT
Start: 2020-01-01 | End: 2020-01-01 | Stop reason: SDUPTHER

## 2020-01-01 RX ORDER — TRAZODONE HYDROCHLORIDE 50 MG/1
50 TABLET ORAL NIGHTLY
Status: DISCONTINUED | OUTPATIENT
Start: 2020-01-01 | End: 2020-01-01 | Stop reason: HOSPADM

## 2020-01-01 RX ORDER — 0.9 % SODIUM CHLORIDE 0.9 %
250 INTRAVENOUS SOLUTION INTRAVENOUS ONCE
Status: DISCONTINUED | OUTPATIENT
Start: 2020-01-01 | End: 2020-01-01

## 2020-01-01 RX ORDER — BUSPIRONE HYDROCHLORIDE 5 MG/1
7.5 TABLET ORAL 2 TIMES DAILY
Status: DISCONTINUED | OUTPATIENT
Start: 2020-01-01 | End: 2020-01-01 | Stop reason: HOSPADM

## 2020-01-01 RX ORDER — 0.9 % SODIUM CHLORIDE 0.9 %
20 INTRAVENOUS SOLUTION INTRAVENOUS ONCE
Status: COMPLETED | OUTPATIENT
Start: 2020-01-01 | End: 2020-01-01

## 2020-01-01 RX ORDER — SODIUM PHOSPHATE, DIBASIC AND SODIUM PHOSPHATE, MONOBASIC 7; 19 G/133ML; G/133ML
ENEMA RECTAL PRN
Status: DISCONTINUED | OUTPATIENT
Start: 2020-01-01 | End: 2020-01-01 | Stop reason: ALTCHOICE

## 2020-01-01 RX ORDER — SODIUM CHLORIDE 0.9 % (FLUSH) 0.9 %
10 SYRINGE (ML) INJECTION EVERY 12 HOURS SCHEDULED
Status: DISCONTINUED | OUTPATIENT
Start: 2020-01-01 | End: 2020-01-01 | Stop reason: HOSPADM

## 2020-01-01 RX ORDER — POLYETHYLENE GLYCOL 3350 17 G/17G
119 POWDER, FOR SOLUTION ORAL ONCE
Status: COMPLETED | OUTPATIENT
Start: 2020-01-01 | End: 2020-01-01

## 2020-01-01 RX ORDER — POLYETHYLENE GLYCOL 3350 17 G/17G
17 POWDER, FOR SOLUTION ORAL DAILY
Status: DISCONTINUED | OUTPATIENT
Start: 2020-01-01 | End: 2020-01-01

## 2020-01-01 RX ORDER — PROMETHAZINE HYDROCHLORIDE 25 MG/1
12.5 TABLET ORAL EVERY 6 HOURS PRN
Status: DISCONTINUED | OUTPATIENT
Start: 2020-01-01 | End: 2020-01-01

## 2020-01-01 RX ORDER — OXYBUTYNIN CHLORIDE 10 MG/1
10 TABLET, EXTENDED RELEASE ORAL 2 TIMES DAILY
Status: DISCONTINUED | OUTPATIENT
Start: 2020-01-01 | End: 2020-01-01 | Stop reason: HOSPADM

## 2020-01-01 RX ORDER — VENLAFAXINE HYDROCHLORIDE 150 MG/1
150 CAPSULE, EXTENDED RELEASE ORAL NIGHTLY
Status: DISCONTINUED | OUTPATIENT
Start: 2020-01-01 | End: 2020-01-01 | Stop reason: HOSPADM

## 2020-01-01 RX ORDER — PROPOFOL 10 MG/ML
INJECTION, EMULSION INTRAVENOUS PRN
Status: DISCONTINUED | OUTPATIENT
Start: 2020-01-01 | End: 2020-01-01 | Stop reason: SDUPTHER

## 2020-01-01 RX ORDER — PANTOPRAZOLE SODIUM 40 MG/10ML
40 INJECTION, POWDER, LYOPHILIZED, FOR SOLUTION INTRAVENOUS EVERY 12 HOURS
Status: DISCONTINUED | OUTPATIENT
Start: 2020-01-01 | End: 2020-01-01 | Stop reason: SDUPTHER

## 2020-01-01 RX ORDER — ACETAMINOPHEN 650 MG/1
650 SUPPOSITORY RECTAL EVERY 6 HOURS PRN
Status: DISCONTINUED | OUTPATIENT
Start: 2020-01-01 | End: 2020-01-01 | Stop reason: HOSPADM

## 2020-01-01 RX ORDER — SODIUM CHLORIDE 0.9 % (FLUSH) 0.9 %
10 SYRINGE (ML) INJECTION
Status: COMPLETED | OUTPATIENT
Start: 2020-01-01 | End: 2020-01-01

## 2020-01-01 RX ORDER — PRAVASTATIN SODIUM 20 MG
40 TABLET ORAL DAILY
Status: DISCONTINUED | OUTPATIENT
Start: 2020-01-01 | End: 2020-01-01 | Stop reason: HOSPADM

## 2020-01-01 RX ORDER — POLYETHYLENE GLYCOL 3350 17 G/17G
17 POWDER, FOR SOLUTION ORAL 2 TIMES DAILY
Status: DISCONTINUED | OUTPATIENT
Start: 2020-01-01 | End: 2020-01-01

## 2020-01-01 RX ORDER — LACTULOSE 10 G/15ML
20 SOLUTION ORAL 3 TIMES DAILY
Status: DISCONTINUED | OUTPATIENT
Start: 2020-01-01 | End: 2020-01-01 | Stop reason: HOSPADM

## 2020-01-01 RX ORDER — ONDANSETRON 2 MG/ML
4 INJECTION INTRAMUSCULAR; INTRAVENOUS EVERY 6 HOURS PRN
Status: DISCONTINUED | OUTPATIENT
Start: 2020-01-01 | End: 2020-01-01 | Stop reason: HOSPADM

## 2020-01-01 RX ORDER — FUROSEMIDE 10 MG/ML
10 INJECTION INTRAMUSCULAR; INTRAVENOUS ONCE
Status: COMPLETED | OUTPATIENT
Start: 2020-01-01 | End: 2020-01-01

## 2020-01-01 RX ORDER — DEXTROSE MONOHYDRATE 25 G/50ML
INJECTION, SOLUTION INTRAVENOUS PRN
Status: DISCONTINUED | OUTPATIENT
Start: 2020-01-01 | End: 2020-01-01 | Stop reason: SDUPTHER

## 2020-01-01 RX ORDER — INSULIN GLARGINE 100 [IU]/ML
18 INJECTION, SOLUTION SUBCUTANEOUS NIGHTLY
Status: DISCONTINUED | OUTPATIENT
Start: 2020-01-01 | End: 2020-01-01 | Stop reason: HOSPADM

## 2020-01-01 RX ORDER — DEXTROSE MONOHYDRATE 50 MG/ML
INJECTION, SOLUTION INTRAVENOUS CONTINUOUS PRN
Status: DISCONTINUED | OUTPATIENT
Start: 2020-01-01 | End: 2020-01-01 | Stop reason: SDUPTHER

## 2020-01-01 RX ORDER — MAGNESIUM GLUCONATE 27 MG(500)
500 TABLET ORAL DAILY
Status: DISCONTINUED | OUTPATIENT
Start: 2020-01-01 | End: 2020-01-01 | Stop reason: HOSPADM

## 2020-01-01 RX ORDER — VENLAFAXINE HYDROCHLORIDE 150 MG/1
150 CAPSULE, EXTENDED RELEASE ORAL NIGHTLY
COMMUNITY

## 2020-01-01 RX ORDER — SODIUM CHLORIDE 9 MG/ML
INJECTION, SOLUTION INTRAVENOUS CONTINUOUS
Status: DISCONTINUED | OUTPATIENT
Start: 2020-01-01 | End: 2020-01-01

## 2020-01-01 RX ORDER — LACTOBACILLUS RHAMNOSUS GG 10B CELL
1 CAPSULE ORAL DAILY
Status: DISCONTINUED | OUTPATIENT
Start: 2020-01-01 | End: 2020-01-01 | Stop reason: HOSPADM

## 2020-01-01 RX ORDER — GREEN TEA/HOODIA GORDONII 315-12.5MG
1 CAPSULE ORAL DAILY
COMMUNITY

## 2020-01-01 RX ORDER — BUMETANIDE 1 MG/1
1 TABLET ORAL 2 TIMES DAILY
Status: ON HOLD | COMMUNITY
End: 2020-01-01 | Stop reason: HOSPADM

## 2020-01-01 RX ORDER — 0.9 % SODIUM CHLORIDE 0.9 %
1000 INTRAVENOUS SOLUTION INTRAVENOUS ONCE
Status: COMPLETED | OUTPATIENT
Start: 2020-01-01 | End: 2020-01-01

## 2020-01-01 RX ORDER — FUROSEMIDE 20 MG/1
20 TABLET ORAL DAILY
COMMUNITY

## 2020-01-01 RX ORDER — LEVOTHYROXINE SODIUM 0.15 MG/1
150 TABLET ORAL DAILY
Status: DISCONTINUED | OUTPATIENT
Start: 2020-01-01 | End: 2020-01-01 | Stop reason: HOSPADM

## 2020-01-01 RX ORDER — PANTOPRAZOLE SODIUM 40 MG/10ML
40 INJECTION, POWDER, LYOPHILIZED, FOR SOLUTION INTRAVENOUS ONCE
Status: COMPLETED | OUTPATIENT
Start: 2020-01-01 | End: 2020-01-01

## 2020-01-01 RX ORDER — SENNA PLUS 8.6 MG/1
2 TABLET ORAL 2 TIMES DAILY
Status: DISCONTINUED | OUTPATIENT
Start: 2020-01-01 | End: 2020-01-01 | Stop reason: HOSPADM

## 2020-01-01 RX ORDER — SUCRALFATE 1 G/1
1 TABLET ORAL 3 TIMES DAILY
Status: DISCONTINUED | OUTPATIENT
Start: 2020-01-01 | End: 2020-01-01 | Stop reason: HOSPADM

## 2020-01-01 RX ORDER — 0.9 % SODIUM CHLORIDE 0.9 %
20 INTRAVENOUS SOLUTION INTRAVENOUS ONCE
Status: DISCONTINUED | OUTPATIENT
Start: 2020-01-01 | End: 2020-01-01

## 2020-01-01 RX ORDER — ALOGLIPTIN 6.25 MG/1
6.25 TABLET, FILM COATED ORAL DAILY
Status: DISCONTINUED | OUTPATIENT
Start: 2020-01-01 | End: 2020-01-01 | Stop reason: HOSPADM

## 2020-01-01 RX ORDER — THIAMINE HCL 100 MG
500 TABLET ORAL DAILY
COMMUNITY

## 2020-01-01 RX ORDER — PANTOPRAZOLE SODIUM 40 MG/10ML
40 INJECTION, POWDER, LYOPHILIZED, FOR SOLUTION INTRAVENOUS 2 TIMES DAILY
Status: DISCONTINUED | OUTPATIENT
Start: 2020-01-01 | End: 2020-01-01 | Stop reason: HOSPADM

## 2020-01-01 RX ADMIN — Medication 1 CAPSULE: at 08:56

## 2020-01-01 RX ADMIN — GABAPENTIN 600 MG: 600 TABLET, FILM COATED ORAL at 20:01

## 2020-01-01 RX ADMIN — FUROSEMIDE 10 MG: 10 INJECTION, SOLUTION INTRAMUSCULAR; INTRAVENOUS at 15:22

## 2020-01-01 RX ADMIN — PANTOPRAZOLE SODIUM 40 MG: 40 INJECTION, POWDER, FOR SOLUTION INTRAVENOUS at 12:01

## 2020-01-01 RX ADMIN — ALLOPURINOL 300 MG: 300 TABLET ORAL at 09:32

## 2020-01-01 RX ADMIN — CEFEPIME HYDROCHLORIDE 2 G: 2 INJECTION, POWDER, FOR SOLUTION INTRAVENOUS at 10:56

## 2020-01-01 RX ADMIN — Medication 1 CAPSULE: at 18:37

## 2020-01-01 RX ADMIN — SUCRALFATE 1 G: 1 TABLET ORAL at 09:32

## 2020-01-01 RX ADMIN — Medication 500 MG: at 18:36

## 2020-01-01 RX ADMIN — VENLAFAXINE HYDROCHLORIDE 150 MG: 150 CAPSULE, EXTENDED RELEASE ORAL at 20:15

## 2020-01-01 RX ADMIN — GABAPENTIN 600 MG: 600 TABLET, FILM COATED ORAL at 08:27

## 2020-01-01 RX ADMIN — OXYBUTYNIN CHLORIDE 10 MG: 10 TABLET, EXTENDED RELEASE ORAL at 21:16

## 2020-01-01 RX ADMIN — PANTOPRAZOLE SODIUM 40 MG: 40 INJECTION, POWDER, FOR SOLUTION INTRAVENOUS at 20:18

## 2020-01-01 RX ADMIN — BUSPIRONE HYDROCHLORIDE 7.5 MG: 5 TABLET ORAL at 08:29

## 2020-01-01 RX ADMIN — POLYETHYLENE GLYCOL 3350 500 G: 17 POWDER, FOR SOLUTION ORAL at 14:41

## 2020-01-01 RX ADMIN — SODIUM CHLORIDE, PRESERVATIVE FREE 10 ML: 5 INJECTION INTRAVENOUS at 09:17

## 2020-01-01 RX ADMIN — OXYBUTYNIN CHLORIDE 10 MG: 10 TABLET, EXTENDED RELEASE ORAL at 09:33

## 2020-01-01 RX ADMIN — SODIUM CHLORIDE, PRESERVATIVE FREE 10 ML: 5 INJECTION INTRAVENOUS at 16:05

## 2020-01-01 RX ADMIN — ALLOPURINOL 300 MG: 300 TABLET ORAL at 09:16

## 2020-01-01 RX ADMIN — SODIUM CHLORIDE, PRESERVATIVE FREE 10 ML: 5 INJECTION INTRAVENOUS at 09:33

## 2020-01-01 RX ADMIN — SODIUM CHLORIDE: 9 INJECTION, SOLUTION INTRAVENOUS at 10:00

## 2020-01-01 RX ADMIN — GABAPENTIN 600 MG: 600 TABLET, FILM COATED ORAL at 20:18

## 2020-01-01 RX ADMIN — BUSPIRONE HYDROCHLORIDE 7.5 MG: 5 TABLET ORAL at 14:54

## 2020-01-01 RX ADMIN — LACTULOSE 20 G: 20 SOLUTION ORAL at 20:41

## 2020-01-01 RX ADMIN — Medication 1 CAPSULE: at 09:15

## 2020-01-01 RX ADMIN — BUSPIRONE HYDROCHLORIDE 7.5 MG: 5 TABLET ORAL at 18:35

## 2020-01-01 RX ADMIN — DEXTROSE MONOHYDRATE: 50 INJECTION, SOLUTION INTRAVENOUS at 10:39

## 2020-01-01 RX ADMIN — POTASSIUM CHLORIDE 40 MEQ: 1500 TABLET, EXTENDED RELEASE ORAL at 11:43

## 2020-01-01 RX ADMIN — POLYETHYLENE GLYCOL 3350 17 G: 17 POWDER, FOR SOLUTION ORAL at 14:49

## 2020-01-01 RX ADMIN — GABAPENTIN 600 MG: 600 TABLET, FILM COATED ORAL at 20:14

## 2020-01-01 RX ADMIN — PRAVASTATIN SODIUM 40 MG: 20 TABLET ORAL at 08:30

## 2020-01-01 RX ADMIN — Medication 500 MG: at 08:27

## 2020-01-01 RX ADMIN — SENNOSIDES 17.2 MG: 8.6 TABLET, FILM COATED ORAL at 08:55

## 2020-01-01 RX ADMIN — GABAPENTIN 600 MG: 600 TABLET, FILM COATED ORAL at 20:35

## 2020-01-01 RX ADMIN — PANTOPRAZOLE SODIUM 40 MG: 40 INJECTION, POWDER, FOR SOLUTION INTRAVENOUS at 20:33

## 2020-01-01 RX ADMIN — SENNOSIDES 17.2 MG: 8.6 TABLET, FILM COATED ORAL at 08:28

## 2020-01-01 RX ADMIN — Medication 500 MG: at 09:17

## 2020-01-01 RX ADMIN — SODIUM CHLORIDE, PRESERVATIVE FREE 10 ML: 5 INJECTION INTRAVENOUS at 20:15

## 2020-01-01 RX ADMIN — PROPOFOL 50 MG: 10 INJECTION, EMULSION INTRAVENOUS at 10:19

## 2020-01-01 RX ADMIN — SUCRALFATE 1 G: 1 TABLET ORAL at 13:30

## 2020-01-01 RX ADMIN — SODIUM CHLORIDE, PRESERVATIVE FREE 10 ML: 5 INJECTION INTRAVENOUS at 20:12

## 2020-01-01 RX ADMIN — PANTOPRAZOLE SODIUM 40 MG: 40 INJECTION, POWDER, FOR SOLUTION INTRAVENOUS at 08:30

## 2020-01-01 RX ADMIN — BUSPIRONE HYDROCHLORIDE 7.5 MG: 5 TABLET ORAL at 08:55

## 2020-01-01 RX ADMIN — BUSPIRONE HYDROCHLORIDE 7.5 MG: 5 TABLET ORAL at 09:15

## 2020-01-01 RX ADMIN — DEXTROSE MONOHYDRATE: 50 INJECTION, SOLUTION INTRAVENOUS at 12:30

## 2020-01-01 RX ADMIN — LACTULOSE 20 G: 20 SOLUTION ORAL at 20:01

## 2020-01-01 RX ADMIN — PROPOFOL 100 MG: 10 INJECTION, EMULSION INTRAVENOUS at 10:51

## 2020-01-01 RX ADMIN — POLYETHYLENE GLYCOL 3350 119 G: 17 POWDER, FOR SOLUTION ORAL at 19:46

## 2020-01-01 RX ADMIN — SODIUM CHLORIDE, PRESERVATIVE FREE 10 ML: 5 INJECTION INTRAVENOUS at 20:34

## 2020-01-01 RX ADMIN — TRAZODONE HYDROCHLORIDE 50 MG: 50 TABLET ORAL at 20:12

## 2020-01-01 RX ADMIN — ALLOPURINOL 300 MG: 300 TABLET ORAL at 18:36

## 2020-01-01 RX ADMIN — Medication 500 MG: at 08:30

## 2020-01-01 RX ADMIN — VENLAFAXINE HYDROCHLORIDE 150 MG: 150 CAPSULE, EXTENDED RELEASE ORAL at 20:12

## 2020-01-01 RX ADMIN — LEVOTHYROXINE SODIUM 150 MCG: 0.15 TABLET ORAL at 18:36

## 2020-01-01 RX ADMIN — ALOGLIPTIN 6.25 MG: 6.25 TABLET, FILM COATED ORAL at 18:35

## 2020-01-01 RX ADMIN — PRAVASTATIN SODIUM 40 MG: 20 TABLET ORAL at 14:50

## 2020-01-01 RX ADMIN — LACTULOSE 20 G: 20 SOLUTION ORAL at 13:49

## 2020-01-01 RX ADMIN — BUSPIRONE HYDROCHLORIDE 7.5 MG: 5 TABLET ORAL at 18:09

## 2020-01-01 RX ADMIN — SUCRALFATE 1 G: 1 TABLET ORAL at 08:55

## 2020-01-01 RX ADMIN — OXYBUTYNIN CHLORIDE 10 MG: 10 TABLET, EXTENDED RELEASE ORAL at 20:23

## 2020-01-01 RX ADMIN — GABAPENTIN 600 MG: 600 TABLET, FILM COATED ORAL at 20:11

## 2020-01-01 RX ADMIN — PRAVASTATIN SODIUM 40 MG: 20 TABLET ORAL at 08:56

## 2020-01-01 RX ADMIN — TRAZODONE HYDROCHLORIDE 50 MG: 50 TABLET ORAL at 20:23

## 2020-01-01 RX ADMIN — Medication 1 CAPSULE: at 09:32

## 2020-01-01 RX ADMIN — BUSPIRONE HYDROCHLORIDE 7.5 MG: 5 TABLET ORAL at 09:33

## 2020-01-01 RX ADMIN — SUCRALFATE 1 G: 1 TABLET ORAL at 20:01

## 2020-01-01 RX ADMIN — PHENYLEPHRINE HYDROCHLORIDE 100 MCG: 10 INJECTION INTRAVENOUS at 11:20

## 2020-01-01 RX ADMIN — GABAPENTIN 600 MG: 600 TABLET, FILM COATED ORAL at 20:41

## 2020-01-01 RX ADMIN — ALLOPURINOL 300 MG: 300 TABLET ORAL at 08:28

## 2020-01-01 RX ADMIN — LEVOTHYROXINE SODIUM 150 MCG: 0.15 TABLET ORAL at 05:04

## 2020-01-01 RX ADMIN — SODIUM CHLORIDE, PRESERVATIVE FREE 10 ML: 5 INJECTION INTRAVENOUS at 20:02

## 2020-01-01 RX ADMIN — BISACODYL 10 MG: 5 TABLET, COATED ORAL at 03:38

## 2020-01-01 RX ADMIN — SUCRALFATE 1 G: 1 TABLET ORAL at 20:34

## 2020-01-01 RX ADMIN — PROPOFOL 50 MG: 10 INJECTION, EMULSION INTRAVENOUS at 11:15

## 2020-01-01 RX ADMIN — PANTOPRAZOLE SODIUM 40 MG: 40 INJECTION, POWDER, FOR SOLUTION INTRAVENOUS at 09:33

## 2020-01-01 RX ADMIN — SUCRALFATE 1 G: 1 TABLET ORAL at 20:14

## 2020-01-01 RX ADMIN — SUCRALFATE 1 G: 1 TABLET ORAL at 08:27

## 2020-01-01 RX ADMIN — PROPOFOL 50 MG: 10 INJECTION, EMULSION INTRAVENOUS at 10:38

## 2020-01-01 RX ADMIN — BUSPIRONE HYDROCHLORIDE 7.5 MG: 5 TABLET ORAL at 17:26

## 2020-01-01 RX ADMIN — DIATRIZOATE MEGLUMINE AND DIATRIZOATE SODIUM 1440 ML: 660; 100 LIQUID ORAL; RECTAL at 12:22

## 2020-01-01 RX ADMIN — SODIUM CHLORIDE 1000 ML: 9 INJECTION, SOLUTION INTRAVENOUS at 10:56

## 2020-01-01 RX ADMIN — Medication 10 ML: at 12:31

## 2020-01-01 RX ADMIN — VENLAFAXINE HYDROCHLORIDE 150 MG: 150 CAPSULE, EXTENDED RELEASE ORAL at 20:23

## 2020-01-01 RX ADMIN — GABAPENTIN 600 MG: 600 TABLET, FILM COATED ORAL at 20:23

## 2020-01-01 RX ADMIN — PRAVASTATIN SODIUM 40 MG: 20 TABLET ORAL at 09:16

## 2020-01-01 RX ADMIN — ALLOPURINOL 300 MG: 300 TABLET ORAL at 08:27

## 2020-01-01 RX ADMIN — PRAVASTATIN SODIUM 40 MG: 20 TABLET ORAL at 08:28

## 2020-01-01 RX ADMIN — VENLAFAXINE HYDROCHLORIDE 150 MG: 150 CAPSULE, EXTENDED RELEASE ORAL at 20:34

## 2020-01-01 RX ADMIN — GABAPENTIN 600 MG: 600 TABLET, FILM COATED ORAL at 08:55

## 2020-01-01 RX ADMIN — TRAZODONE HYDROCHLORIDE 50 MG: 50 TABLET ORAL at 20:15

## 2020-01-01 RX ADMIN — SENNOSIDES 17.2 MG: 8.6 TABLET, FILM COATED ORAL at 09:32

## 2020-01-01 RX ADMIN — PANTOPRAZOLE SODIUM 40 MG: 40 INJECTION, POWDER, FOR SOLUTION INTRAVENOUS at 14:39

## 2020-01-01 RX ADMIN — Medication 500 MG: at 14:49

## 2020-01-01 RX ADMIN — OXYBUTYNIN CHLORIDE 10 MG: 10 TABLET, EXTENDED RELEASE ORAL at 08:30

## 2020-01-01 RX ADMIN — POTASSIUM CHLORIDE 40 MEQ: 1500 TABLET, EXTENDED RELEASE ORAL at 09:16

## 2020-01-01 RX ADMIN — PANTOPRAZOLE SODIUM 40 MG: 40 INJECTION, POWDER, FOR SOLUTION INTRAVENOUS at 08:54

## 2020-01-01 RX ADMIN — VENLAFAXINE HYDROCHLORIDE 150 MG: 150 CAPSULE, EXTENDED RELEASE ORAL at 20:41

## 2020-01-01 RX ADMIN — POTASSIUM CHLORIDE 40 MEQ: 1500 TABLET, EXTENDED RELEASE ORAL at 08:54

## 2020-01-01 RX ADMIN — SENNOSIDES 17.2 MG: 8.6 TABLET, FILM COATED ORAL at 20:01

## 2020-01-01 RX ADMIN — PHENYLEPHRINE HYDROCHLORIDE 100 MCG: 10 INJECTION INTRAVENOUS at 11:27

## 2020-01-01 RX ADMIN — SODIUM CHLORIDE, PRESERVATIVE FREE 10 ML: 5 INJECTION INTRAVENOUS at 08:27

## 2020-01-01 RX ADMIN — PROPOFOL 100 MG: 10 INJECTION, EMULSION INTRAVENOUS at 11:08

## 2020-01-01 RX ADMIN — LACTULOSE 20 G: 20 SOLUTION ORAL at 13:30

## 2020-01-01 RX ADMIN — LEVOTHYROXINE SODIUM 150 MCG: 0.15 TABLET ORAL at 14:39

## 2020-01-01 RX ADMIN — SENNOSIDES 17.2 MG: 8.6 TABLET, FILM COATED ORAL at 20:12

## 2020-01-01 RX ADMIN — PRAVASTATIN SODIUM 40 MG: 20 TABLET ORAL at 18:36

## 2020-01-01 RX ADMIN — VENLAFAXINE HYDROCHLORIDE 150 MG: 150 CAPSULE, EXTENDED RELEASE ORAL at 20:17

## 2020-01-01 RX ADMIN — PHENYLEPHRINE HYDROCHLORIDE 100 MCG: 10 INJECTION INTRAVENOUS at 11:10

## 2020-01-01 RX ADMIN — BUSPIRONE HYDROCHLORIDE 7.5 MG: 5 TABLET ORAL at 17:53

## 2020-01-01 RX ADMIN — SUCRALFATE 1 G: 1 TABLET ORAL at 13:53

## 2020-01-01 RX ADMIN — LACTULOSE 20 G: 20 SOLUTION ORAL at 13:42

## 2020-01-01 RX ADMIN — OXYBUTYNIN CHLORIDE 10 MG: 10 TABLET, EXTENDED RELEASE ORAL at 08:55

## 2020-01-01 RX ADMIN — SODIUM CHLORIDE: 9 INJECTION, SOLUTION INTRAVENOUS at 10:02

## 2020-01-01 RX ADMIN — GABAPENTIN 600 MG: 600 TABLET, FILM COATED ORAL at 09:32

## 2020-01-01 RX ADMIN — SUCRALFATE 1 G: 1 TABLET ORAL at 14:39

## 2020-01-01 RX ADMIN — METRONIDAZOLE 500 MG: 500 INJECTION, SOLUTION INTRAVENOUS at 11:52

## 2020-01-01 RX ADMIN — SUCRALFATE 1 G: 1 TABLET ORAL at 20:18

## 2020-01-01 RX ADMIN — LACTULOSE 20 G: 20 SOLUTION ORAL at 20:14

## 2020-01-01 RX ADMIN — SODIUM CHLORIDE, PRESERVATIVE FREE 10 ML: 5 INJECTION INTRAVENOUS at 08:54

## 2020-01-01 RX ADMIN — PROPOFOL 50 MG: 10 INJECTION, EMULSION INTRAVENOUS at 10:43

## 2020-01-01 RX ADMIN — OXYBUTYNIN CHLORIDE 10 MG: 10 TABLET, EXTENDED RELEASE ORAL at 20:01

## 2020-01-01 RX ADMIN — TRAZODONE HYDROCHLORIDE 50 MG: 50 TABLET ORAL at 20:18

## 2020-01-01 RX ADMIN — PANTOPRAZOLE SODIUM 40 MG: 40 INJECTION, POWDER, FOR SOLUTION INTRAVENOUS at 08:28

## 2020-01-01 RX ADMIN — POLYETHYLENE GLYCOL-3350 AND ELECTROLYTES 4000 ML: 236; 6.74; 5.86; 2.97; 22.74 POWDER, FOR SOLUTION ORAL at 18:58

## 2020-01-01 RX ADMIN — LACTULOSE 20 G: 20 SOLUTION ORAL at 08:27

## 2020-01-01 RX ADMIN — PANTOPRAZOLE SODIUM 40 MG: 40 INJECTION, POWDER, FOR SOLUTION INTRAVENOUS at 09:15

## 2020-01-01 RX ADMIN — VENLAFAXINE HYDROCHLORIDE 150 MG: 150 CAPSULE, EXTENDED RELEASE ORAL at 20:01

## 2020-01-01 RX ADMIN — OXYBUTYNIN CHLORIDE 10 MG: 10 TABLET, EXTENDED RELEASE ORAL at 20:12

## 2020-01-01 RX ADMIN — GABAPENTIN 600 MG: 600 TABLET, FILM COATED ORAL at 09:16

## 2020-01-01 RX ADMIN — LEVOTHYROXINE SODIUM 150 MCG: 0.15 TABLET ORAL at 14:49

## 2020-01-01 RX ADMIN — IOPAMIDOL 110 ML: 755 INJECTION, SOLUTION INTRAVENOUS at 12:31

## 2020-01-01 RX ADMIN — PANTOPRAZOLE SODIUM 40 MG: 40 INJECTION, POWDER, FOR SOLUTION INTRAVENOUS at 16:05

## 2020-01-01 RX ADMIN — ALLOPURINOL 300 MG: 300 TABLET ORAL at 14:49

## 2020-01-01 RX ADMIN — Medication 1 CAPSULE: at 08:30

## 2020-01-01 RX ADMIN — PANTOPRAZOLE SODIUM 40 MG: 40 INJECTION, POWDER, FOR SOLUTION INTRAVENOUS at 20:01

## 2020-01-01 RX ADMIN — INSULIN GLARGINE 18 UNITS: 100 INJECTION, SOLUTION SUBCUTANEOUS at 20:21

## 2020-01-01 RX ADMIN — SENNOSIDES 8.6 MG: 8.6 TABLET, FILM COATED ORAL at 20:41

## 2020-01-01 RX ADMIN — SUCRALFATE 1 G: 1 TABLET ORAL at 13:49

## 2020-01-01 RX ADMIN — SUCRALFATE 1 G: 1 TABLET ORAL at 20:41

## 2020-01-01 RX ADMIN — LACTULOSE 20 G: 20 SOLUTION ORAL at 09:32

## 2020-01-01 RX ADMIN — PANTOPRAZOLE SODIUM 40 MG: 40 INJECTION, POWDER, FOR SOLUTION INTRAVENOUS at 20:40

## 2020-01-01 RX ADMIN — OXYBUTYNIN CHLORIDE 10 MG: 10 TABLET, EXTENDED RELEASE ORAL at 20:34

## 2020-01-01 RX ADMIN — DEXTROSE MONOHYDRATE 50 ML: 25 INJECTION, SOLUTION INTRAVENOUS at 07:33

## 2020-01-01 RX ADMIN — PANTOPRAZOLE SODIUM 40 MG: 40 INJECTION, POWDER, FOR SOLUTION INTRAVENOUS at 20:22

## 2020-01-01 RX ADMIN — PRAVASTATIN SODIUM 40 MG: 20 TABLET ORAL at 09:32

## 2020-01-01 RX ADMIN — OXYBUTYNIN CHLORIDE 10 MG: 10 TABLET, EXTENDED RELEASE ORAL at 20:41

## 2020-01-01 RX ADMIN — GABAPENTIN 600 MG: 600 TABLET, FILM COATED ORAL at 08:28

## 2020-01-01 RX ADMIN — LEVOTHYROXINE SODIUM 150 MCG: 0.15 TABLET ORAL at 09:33

## 2020-01-01 RX ADMIN — BUSPIRONE HYDROCHLORIDE 7.5 MG: 5 TABLET ORAL at 18:36

## 2020-01-01 RX ADMIN — SODIUM CHLORIDE: 9 INJECTION, SOLUTION INTRAVENOUS at 11:18

## 2020-01-01 RX ADMIN — PROPOFOL 75 MG: 10 INJECTION, EMULSION INTRAVENOUS at 10:25

## 2020-01-01 RX ADMIN — OXYBUTYNIN CHLORIDE 10 MG: 10 TABLET, EXTENDED RELEASE ORAL at 08:28

## 2020-01-01 RX ADMIN — LEVOTHYROXINE SODIUM 150 MCG: 0.15 TABLET ORAL at 08:27

## 2020-01-01 RX ADMIN — SUCRALFATE 1 G: 1 TABLET ORAL at 14:49

## 2020-01-01 RX ADMIN — BUSPIRONE HYDROCHLORIDE 7.5 MG: 5 TABLET ORAL at 08:27

## 2020-01-01 RX ADMIN — SODIUM CHLORIDE 1000 ML: 9 INJECTION, SOLUTION INTRAVENOUS at 11:57

## 2020-01-01 RX ADMIN — SUCRALFATE 1 G: 1 TABLET ORAL at 20:23

## 2020-01-01 RX ADMIN — LEVOTHYROXINE SODIUM 150 MCG: 0.15 TABLET ORAL at 05:36

## 2020-01-01 RX ADMIN — SUCRALFATE 1 G: 1 TABLET ORAL at 13:42

## 2020-01-01 RX ADMIN — PROPOFOL 100 MG: 10 INJECTION, EMULSION INTRAVENOUS at 10:31

## 2020-01-01 RX ADMIN — TRAZODONE HYDROCHLORIDE 50 MG: 50 TABLET ORAL at 20:34

## 2020-01-01 RX ADMIN — SODIUM CHLORIDE, PRESERVATIVE FREE 10 ML: 5 INJECTION INTRAVENOUS at 20:40

## 2020-01-01 RX ADMIN — INSULIN GLARGINE 18 UNITS: 100 INJECTION, SOLUTION SUBCUTANEOUS at 20:08

## 2020-01-01 RX ADMIN — SODIUM CHLORIDE, PRESERVATIVE FREE 10 ML: 5 INJECTION INTRAVENOUS at 08:28

## 2020-01-01 RX ADMIN — PROPOFOL 180 MG: 10 INJECTION, EMULSION INTRAVENOUS at 10:45

## 2020-01-01 RX ADMIN — SUCRALFATE 1 G: 1 TABLET ORAL at 20:15

## 2020-01-01 RX ADMIN — Medication 500 MG: at 09:33

## 2020-01-01 RX ADMIN — PANTOPRAZOLE SODIUM 40 MG: 40 INJECTION, POWDER, FOR SOLUTION INTRAVENOUS at 20:15

## 2020-01-01 RX ADMIN — ALLOPURINOL 300 MG: 300 TABLET ORAL at 08:55

## 2020-01-01 RX ADMIN — POLYETHYLENE GLYCOL 3350 17 G: 17 POWDER, FOR SOLUTION ORAL at 08:24

## 2020-01-01 RX ADMIN — OXYBUTYNIN CHLORIDE 10 MG: 10 TABLET, EXTENDED RELEASE ORAL at 14:49

## 2020-01-01 RX ADMIN — SODIUM CHLORIDE 20 ML: 9 INJECTION, SOLUTION INTRAVENOUS at 12:05

## 2020-01-01 RX ADMIN — TRAZODONE HYDROCHLORIDE 50 MG: 50 TABLET ORAL at 20:41

## 2020-01-01 RX ADMIN — PROPOFOL 75 MG: 10 INJECTION, EMULSION INTRAVENOUS at 10:16

## 2020-01-01 RX ADMIN — BUSPIRONE HYDROCHLORIDE 7.5 MG: 5 TABLET ORAL at 18:58

## 2020-01-01 RX ADMIN — Medication 1 CAPSULE: at 08:27

## 2020-01-01 RX ADMIN — PROPOFOL 130 MG: 10 INJECTION, EMULSION INTRAVENOUS at 10:23

## 2020-01-01 RX ADMIN — Medication 500 MG: at 08:56

## 2020-01-01 RX ADMIN — SODIUM CHLORIDE, PRESERVATIVE FREE 10 ML: 5 INJECTION INTRAVENOUS at 20:23

## 2020-01-01 RX ADMIN — SENNOSIDES 8.6 MG: 8.6 TABLET, FILM COATED ORAL at 20:16

## 2020-01-01 RX ADMIN — LACTULOSE 20 G: 20 SOLUTION ORAL at 08:24

## 2020-01-01 RX ADMIN — INSULIN GLARGINE 18 UNITS: 100 INJECTION, SOLUTION SUBCUTANEOUS at 20:16

## 2020-01-01 RX ADMIN — OXYBUTYNIN CHLORIDE 10 MG: 10 TABLET, EXTENDED RELEASE ORAL at 09:15

## 2020-01-01 RX ADMIN — PHENYLEPHRINE HYDROCHLORIDE 100 MCG: 10 INJECTION INTRAVENOUS at 11:05

## 2020-01-01 RX ADMIN — LEVOTHYROXINE SODIUM 150 MCG: 0.15 TABLET ORAL at 06:12

## 2020-01-01 RX ADMIN — POLYETHYLENE GLYCOL 3350 17 G: 17 POWDER, FOR SOLUTION ORAL at 09:32

## 2020-01-01 RX ADMIN — OXYBUTYNIN CHLORIDE 10 MG: 10 TABLET, EXTENDED RELEASE ORAL at 20:15

## 2020-01-01 RX ADMIN — SODIUM CHLORIDE, PRESERVATIVE FREE 10 ML: 5 INJECTION INTRAVENOUS at 20:17

## 2020-01-01 RX ADMIN — SUCRALFATE 1 G: 1 TABLET ORAL at 09:17

## 2020-01-01 RX ADMIN — DEXTROSE MONOHYDRATE: 50 INJECTION, SOLUTION INTRAVENOUS at 14:16

## 2020-01-01 RX ADMIN — DEXTROSE MONOHYDRATE 12.5 G: 25 INJECTION, SOLUTION INTRAVENOUS at 10:18

## 2020-01-01 RX ADMIN — SUCRALFATE 1 G: 1 TABLET ORAL at 08:30

## 2020-01-01 RX ADMIN — PANTOPRAZOLE SODIUM 40 MG: 40 INJECTION, POWDER, FOR SOLUTION INTRAVENOUS at 20:12

## 2020-01-01 ASSESSMENT — PAIN SCALES - GENERAL
PAINLEVEL_OUTOF10: 0
PAINLEVEL_OUTOF10: 2
PAINLEVEL_OUTOF10: 0

## 2020-01-01 ASSESSMENT — PAIN DESCRIPTION - PAIN TYPE
TYPE: ACUTE PAIN
TYPE: SURGICAL PAIN

## 2020-01-01 ASSESSMENT — PAIN DESCRIPTION - LOCATION
LOCATION: ABDOMEN
LOCATION: ABDOMEN

## 2020-01-01 ASSESSMENT — PAIN DESCRIPTION - DESCRIPTORS: DESCRIPTORS: OTHER (COMMENT)

## 2020-07-21 PROBLEM — K92.2 GI BLEEDING: Status: ACTIVE | Noted: 2020-01-01

## 2020-07-21 NOTE — LETTER
Beneficiary Notification Letter  BPCI Advanced     Your Doctor or [address],    We wanted to let you know that your health care provider, [address] [place] [address] Stephany, has volunteered to take part in our [facility] & Medicaid Services (CMS) Bundled Payments for [facility] (BPCI Advanced). This doesnt change your Medicare rights or benefits and you dont need to do anything. What are bundled payments? A bundled payment combines, or bundles together, payments that Medicare makes to your health care providers for the many different kinds of medical services you might get in a specific time period. In BPCI Advanced, this time period could include a hospital inpatient stay or outpatient procedure, plus 90 days. Why would Medicare bundle payments? Bundled payments are thought of as a value-based way to pay because health care providers are responsible for both the quality and cost of medical care they give. This is a relatively new way of paying health care providers compared to thefee-for-service way Medicare has traditionally paid, where providers are paid separately for each service they provide. Bundled payments encourage these providers to work together to provide better, more coordinated care during your hospital stay, or outpatient procedure, and through your recovery. What does BPCI Advance mean for me? Youre more likely to get even better care when hospitals, doctors, and other health care providers work together. In BPCI Advanced, hospitals, doctors, and other health care providers may be rewarded for providing better, more coordinated health care. Medicare will watch BPCI Advanced participants closely to make sure that you and other patients keep getting efficient, high quality care. What do I need to know about BPCI Advanced? Whats most important for you to know is that your Medicare rights and benefits wont change because your health care provider is participating in [address] [place]. Medicare will keep covering all of your medically necessary services. Even though Medicare will pay your doctor in a different way under BPCI Advanced, how much you have to pay wont change. Health care providers and suppliers who are enrolled in Medicare will submit their Medicare claims like they always have. Youll have all the same Medicare rights and protections, including the right to choose which hospital, doctor, or other health care provider you see. If you dont want to get care from a health care provider whos participating in [address] [place], then youll have to choose a different health care provider whos not participating in the Model. How can I give feedback about my health care? Medicare might ask you to take a voluntary survey about the services and care you received from [address] [place] [address] during your hospital stay or outpatient procedure and for a specific period of time afterwards. You can decide whether you want to take the voluntary survey, but if you do, itll help Medicare make BPCI Advanced and the care of other Medicare patients better. If you have concerns or complaints about your care, you can:   · Talk to your doctor or health care provider. · Contact your Beneficiary and Family Centered Care Quality Improvement   Organization [facility]). You can get your [employer]s phone number  at  Medicare.gov/contacts or by calling 1-800-MEDICARE. TTY users can call  6-562.285.3972. Where can I learn more about BPCI Advanced? Learn more about BPCI Advanced at https://innovation.cms.gov/initiatives/bpci-advanced/:  · A list of all the hospitals and physician group practices in the country participating in [address] [place]. · All of the inpatient and outpatient Clinical Episodes that are currently included under BPCI Advanced. A Clinical Episode is a grouping of medical conditions or diagnoses that are included in the [address] [facility].

## 2020-07-21 NOTE — ED PROVIDER NOTES
HPI:  20, Time: 8:35 AM EDT         Cassidy Schuler is a 67 y.o. female presenting to the ED for Fatigue weakness Dark stools and black liqud diarrhea, beginning two weeks ago. The complaint has been persistent, moderate in severity, and worsened by nothing. Patient came from home with complaints of black tarry stools on Eliquis for DVT. Is also had liquid dark diarrhea. Also complains abdominal pain is diffuse. She does report a history of diverticulosis in the past.  No history of a GI bleeds. She has nausea no vomiting. No fevers reported. She does have an indwelling Henson catheter. No chest pain shortness of breath. She does report fatigue and generalized weakness. ROS:   Pertinent positives and negatives are stated within HPI, all other systems reviewed and are negative.  --------------------------------------------- PAST HISTORY ---------------------------------------------  Past Medical History:  has a past medical history of Diabetes mellitus ([address] DVT (deep venous thrombosis) ([facility] GERD (gastroesophageal reflux disease), Herniated disc, History of DVT (deep vein thrombosis), Hyperlipidemia, Hypertension, Kidney calculi, Left leg DVT ([address] Osteoarthritis, PE (pulmonary embolism), S/P carotid endarterectomy, S/P IVC filter, Sepsis([address]91), and Thyroid disease. Past Surgical History:  has a past surgical history that includes  section; Cholecystectomy; Salpingo-oophorectomy; Kyphosis surgery (2013); Ureter stent placement (Bilateral, ); Breast biopsy (Left); Tonsillectomy; Appendectomy; Endoscopy, colon, diagnostic; Hysterectomy; and back surgery. Social History:  reports that she has never smoked. She has never used smokeless tobacco. She reports that she does not drink alcohol or use drugs. Family History: family history includes Cancer in her father. The patients home medications have been reviewed. Allergies: Ciprofloxacin; Codeine;  Nitorfurantoin macrocrystalline [nitrofurantoin]; Pcn [penicillins]; Sulfa antibiotics; and Torsemide    ---------------------------------------------------PHYSICAL EXAM--------------------------------------    Constitutional/General: Alert and oriented x3, well appearing, non toxic in NAD  Head: Normocephalic and atraumatic  Eyes: PERRL, EOMI  Mouth: Oropharynx clear, handling secretions, no trismus  Neck: Supple, full ROM, non tender to palpation in the midline, no stridor, no crepitus, no meningeal signs  Pulmonary: Lungs clear to auscultation bilaterally, no wheezes, rales, or rhonchi. Not in respiratory distress  Cardiovascular:  Regular rate. Regular rhythm. No murmurs, gallops, or rubs. 2+ distal pulses  Chest: no chest wall tenderness  Abdomen: Soft. Non tender. Non distended. +BS. No rebound, guarding, or rigidity. No pulsatile masses appreciated. Musculoskeletal: Moves all extremities x 4. Warm and well perfused, no clubbing, cyanosis, or edema. Capillary refill <3 seconds  Skin: warm and dry. No rashes. Neurologic: GCS 15, CN 2-12 grossly intact, no focal deficits, symmetric strength 5/5 in the upper and lower extremities bilaterally  Psych: Normal Affect    -------------------------------------------------- RESULTS -------------------------------------------------  I have personally reviewed all laboratory and imaging results for this patient. Results are listed below.      LABS:  Results for orders placed or performed during the hospital encounter of 07/21/20   CBC   Result Value Ref Range    WBC 14.6 (H) 4.5 - 11.5 E9/L    RBC 3.21 (L) 3.50 - 5.50 E12/L    Hemoglobin 5.7 (LL) 11.5 - 15.5 g/dL    Hematocrit 21.9 (L) 34.0 - 48.0 %    MCV 68.2 (L) 80.0 - 99.9 fL    MCH 17.8 (L) 26.0 - 35.0 pg    MCHC 26.0 (L) 32.0 - 34.5 %    RDW 17.0 (H) 11.5 - 15.0 fL    Platelets 031 (H) 531 - 450 E9/L    MPV 9.9 7.0 - 12.0 fL   Basic Metabolic Panel   Result Value Ref Range    Sodium 140 132 - 146 mmol/L    Potassium 4.5 3.5 - Oxygen Saturation Interpretation: Normal    The patients available past medical records and past encounters were reviewed. ------------------------------ ED COURSE/MEDICAL DECISION MAKING----------------------  Medications   0.9 % sodium chloride bolus (has no administration in time range)   pantoprazole (PROTONIX) injection 40 mg (has no administration in time range)   lactated ringers infusion (has no administration in time range)   0.9 % sodium chloride bolus (0 mLs Intravenous Stopped 7/21/20 1328)   cefepime (MAXIPIME) 2 g IVPB extended (mini-bag) (0 g Intravenous Stopped 7/21/20 1143)   metronidazole (FLAGYL) 500 mg in NaCl 100 mL IVPB premix (0 mg Intravenous Stopped 7/21/20 1302)   0.9 % sodium chloride bolus (0 mLs Intravenous Stopped 7/21/20 1157)   pantoprazole (PROTONIX) injection 40 mg (40 mg Intravenous Given 7/21/20 1201)   sodium chloride flush 0.9 % injection 10 mL (10 mLs Intravenous Given 7/21/20 1231)   iopamidol (ISOVUE-370) 76 % injection 110 mL (110 mLs Intravenous Given 7/21/20 1231)             Medical Decision Making:    Patient presents with tarry stools and then dark bloody diarrhea. Ongoing for several weeks. She also has generalized weakness. Hemoccult was positive. She was found to be severely anemic with hemoglobin 5.7 mg deciliter. She was typed and crossed for 2 units of packed RBCs. Lactate was 4.2. She was given 3 L of normal saline. She was started on IV Flagyl and cefepime. repeat lactic acid pending. CT scan of the abdomen is pending. Re-Evaluations:             Re-evaluation. Patients symptoms show no change      Consultations:             Dr. Allan Fung will admit to tele. Consultations pending with Dr. Adair Puckett and from general surgery service. Critical Care:   CRITICAL CARE:   35 MINUTES.           Please note that the withdrawal or failure to initiate urgent interventions for this patient would likely result in a life threatening deterioration or

## 2020-07-21 NOTE — ED NOTES
Lab called to report critical lactic of 4.2 , Dr. Ranjit Camacho notified      Benny Sanchez RN  07/21/20 1365

## 2020-07-22 NOTE — ANESTHESIA PRE PROCEDURE
10/09/2019    CALCIUM 8.6 07/22/2020    BILITOT <0.2 10/09/2019    ALKPHOS 77 10/09/2019    AST 29 10/09/2019    ALT 14 10/09/2019     BMP    Lab Results   Component Value Date     07/22/2020    K 4.3 07/22/2020     07/22/2020    CO2 27 07/22/2020    BUN 25 07/22/2020    CREATININE 0.9 07/22/2020    CALCIUM 8.6 07/22/2020    GFRAA >60 07/22/2020    LABGLOM >60 07/22/2020    GLUCOSE 65 07/22/2020    GLUCOSE 226 08/02/2011     POCGlucose  Recent Labs     07/21/20  0851 07/22/20  0456   GLUCOSE 161* 65*      Coags    Lab Results   Component Value Date    PROTIME 17.7 10/11/2019    PROTIME 22.5 09/14/2016    INR 1.6 10/11/2019    APTT 49.2 06/22/6157     HCG (If Applicable) No results found for: PREGTESTUR, PREGSERUM, HCG, HCGQUANT   ABGs No results found for: PHART, PO2ART, BWK2QZF, AYE3SMF, BEART, T6RNYBBV   Type & Screen (If Applicable)  Lab Results   Component Value Date    ABORH B POS 07/21/2020     Radiology (If Applicable)  Cardiac Testing (If Applicable)   EKG (If Applicable)   Medications prior to admission:   Prior to Admission medications    Medication Sig Start Date End Date Taking? Authorizing Provider   bumetanide (BUMEX) 1 MG tablet Take 1 mg by mouth 2 times daily   Yes Historical Provider, MD   gabapentin (NEURONTIN) 600 MG tablet Take 600 mg by mouth 2 times daily.    Yes Historical Provider, MD   Magnesium 500 MG TABS Take 1 tablet by mouth daily   Yes Historical Provider, MD   sucralfate (CARAFATE) 1 GM tablet Take 1 g by mouth 3 times daily   Yes Historical Provider, MD   furosemide (LASIX) 20 MG tablet Take 20 mg by mouth daily   Yes Historical Provider, MD   venlafaxine (EFFEXOR XR) 150 MG extended release capsule Take 150 mg by mouth nightly   Yes Historical Provider, MD   Probiotic Acidophilus (FLORANEX) TABS Take 1 tablet by mouth daily   Yes Historical Provider, MD   allopurinol (ZYLOPRIM) 300 MG tablet Take 1 tablet by mouth daily 10/12/19  Yes Vazquez Sanders MD   busPIRone (BUSPAR) 7.5 MG tablet Take 7.5 mg by mouth 2 times daily    Yes Historical Provider, MD   oxybutynin (DITROPAN-XL) 10 MG extended release tablet Take 10 mg by mouth 2 times daily   Yes Historical Provider, MD   apixaban (ELIQUIS) 5 MG TABS tablet Take 5 mg by mouth 2 times daily   Yes Historical Provider, MD   Multiple Vitamins-Minerals (PRESERVISION AREDS 2) CAPS Take 1 capsule by mouth 2 times daily    Yes Historical Provider, MD   traZODone (DESYREL) 50 MG tablet Take 50 mg by mouth nightly   Yes Historical Provider, MD   Cranberry-Vitamin C-Probiotic (AZO CRANBERRY) 250-30 MG TABS Take 2 tablets by mouth daily    Yes Historical Provider, MD   insulin glargine (LANTUS) 100 UNIT/ML injection Inject 18 Units into the skin nightly    Yes Historical Provider, MD   pravastatin (PRAVACHOL) 40 MG tablet Take 40 mg by mouth daily  11/7/13  Yes Historical Provider, MD   pantoprazole (PROTONIX) 40 MG tablet Take 1 tablet by mouth daily. 7/6/13  Yes Fantasma Man MD   levothyroxine (SYNTHROID) 150 MCG tablet Take 150 mcg by mouth daily    Yes Historical Provider, MD   sitaGLIPtan (JANUVIA) 100 MG tablet Take 100 mg by mouth daily. Yes Historical Provider, MD   glimepiride (AMARYL) 4 MG tablet Take 4 mg by mouth every morning.    Yes Historical Provider, MD       Current medications:    Current Facility-Administered Medications   Medication Dose Route Frequency Provider Last Rate Last Dose    0.9 % sodium chloride bolus  250 mL Intravenous Once Nirmal Dhaliwal DO        allopurinol (ZYLOPRIM) tablet 300 mg  300 mg Oral Daily Clarice Souza MD   300 mg at 07/21/20 1836    busPIRone (BUSPAR) tablet 7.5 mg  7.5 mg Oral BID Clarice Souza MD   7.5 mg at 07/21/20 1836    gabapentin (NEURONTIN) tablet 600 mg  600 mg Oral BID Clarice Souza MD   600 mg at 07/21/20 2018    insulin glargine (LANTUS) injection vial 18 Units  18 Units Subcutaneous Nightly Clarice Souza MD        levothyroxine (SYNTHROID) tablet 150 mcg  150 mcg Oral Daily Aries Ham MD   150 mcg at 07/21/20 1836    magnesium gluconate (MAGONATE) tablet 500 mg  500 mg Oral Daily Aries Ham MD   500 mg at 07/21/20 1836    oxybutynin (DITROPAN-XL) extended release tablet 10 mg  10 mg Oral BID Aries Ham MD   10 mg at 07/21/20 2116    pravastatin (PRAVACHOL) tablet 40 mg  40 mg Oral Daily Aries Ham MD   40 mg at 07/21/20 1836    lactobacillus (CULTURELLE) capsule 1 capsule  1 capsule Oral Daily Aries Ham MD   1 capsule at 07/21/20 1837    alogliptin (NESINA) tablet 6.25 mg  6.25 mg Oral Daily Aries Ham MD   6.25 mg at 07/21/20 1835    sucralfate (CARAFATE) tablet 1 g  1 g Oral TID Aries Ham MD   1 g at 07/21/20 2018    traZODone (DESYREL) tablet 50 mg  50 mg Oral Nightly Aries Ham MD   50 mg at 07/21/20 2018    venlafaxine (EFFEXOR XR) extended release capsule 150 mg  150 mg Oral Nightly Aries Ham MD   150 mg at 07/21/20 2017    0.9 % sodium chloride infusion   Intravenous Continuous Aries Ham MD        sodium chloride flush 0.9 % injection 10 mL  10 mL Intravenous 2 times per day Aries Ham MD   10 mL at 07/21/20 2017    sodium chloride flush 0.9 % injection 10 mL  10 mL Intravenous PRN Aries Ham MD        acetaminophen (TYLENOL) tablet 650 mg  650 mg Oral Q6H PRN Aries Ham MD        Or   [address] [place] [address] acetaminophen (TYLENOL) suppository 650 mg  650 mg Rectal Q6H PRN Aries Ham MD        promethazine (PHENERGAN) tablet 12.5 mg  12.5 mg Oral Q6H PRN Aries Ham MD        Or    ondansetron [facility]) injection 4 mg  4 mg Intravenous Q6H PRN Aries Ham MD        pantoprazole (PROTONIX) injection 40 mg  40 mg Intravenous BID Benjie Dakins, MD   40 mg at 07/21/20 2018    lactated ringers infusion   Intravenous Continuous Benjie Dakins,  mL/hr at 07/21/20 2114 Facility-Administered Medications Ordered in Other Encounters   Medication Dose Route Frequency Provider Last Rate Last Dose    0.9 % sodium chloride infusion    Continuous PRN Corey Jamison RN        propofol injection    PRN Keara Vasquez RN   130 mg at 20 1023       Allergies:     Allergies   Allergen Reactions    Ciprofloxacin     Codeine      Upset stomach    Nitorfurantoin Macrocrystalline [Nitrofurantoin]     Pcn [Penicillins] Hives    Sulfa Antibiotics Hives    Torsemide        Problem List:    Patient Active Problem List   Diagnosis Code    S/P IVC filter K74.032    History of DVT (deep vein thrombosis) Z86.718    Morbid obesity ([facility]) E66.01    DKA (diabetic ketoacidosis) ([facility]) I89.78    Metabolic encephalopathy W00.50    Acute respiratory failure ([facility]) J96.00    Catheter-associated urinary tract infection ([facility]) T83.511A, N39.0    DM (diabetes mellitus), type 2, uncontrolled ([facility] E11.65    Ambulatory dysfunction R26.2    Bacteriuria with pyuria R82.71, R82.81    GI bleeding K92.2       Past Medical History:        Diagnosis Date    Diabetes mellitus ([facility]     DVT (deep venous thrombosis) ([facility])     GERD (gastroesophageal reflux disease)     Herniated disc     History of DVT (deep vein thrombosis) 2014    Hyperlipidemia     Hypertension     Kidney calculi     Left leg DVT ([address] 11/15/2013    Osteoarthritis     PE (pulmonary embolism)     S/P carotid endarterectomy 2013    S/P IVC filter 2013    Sepsis(995.91)     Thyroid disease        Past Surgical History:        Procedure Laterality Date    APPENDECTOMY      BACK SURGERY      lower back kyphosis surgery    BREAST BIOPSY Left      SECTION      CHOLECYSTECTOMY      ENDOSCOPY, COLON, DIAGNOSTIC      HYSTERECTOMY      KYPHOSIS SURGERY  2013    T 12,  L1,  L2 BONE BIOPSY, EPIDURAL CATHETER    SALPINGO-OOPHORECTOMY      TONSILLECTOMY      URETER STENT PLACEMENT Bilateral  has had this done twice       Social History:    Social History     Tobacco Use    Smoking status: Never Smoker    Smokeless tobacco: Never Used   Substance Use Topics    Alcohol use: No                                Counseling given: Not Answered      Vital Signs (Current):   Vitals:    07/21/20 1645 07/21/20 2045 07/22/20 0053 07/22/20 0800   BP: (!) 108/53 (!) 112/55 (!) 114/56 (!) 108/54   Pulse: 83 84 82 100   Resp: 16 16 16 18   Temp: 36.7 °C (98 °F) 36.7 °C (98.1 °F) 36.6 °C (97.8 °F) 37.1 °C (98.8 °F)   TempSrc: Temporal Oral Oral Temporal   SpO2:  98% 98% 97%   Weight:       Height:                                                  BP Readings from Last 3 Encounters:   07/22/20 (!) 108/54   07/22/20 128/61   10/11/19 127/61       NPO Status:                                                                                 BMI:   Wt Readings from Last 3 Encounters:   07/21/20 250 lb (113.4 kg)   10/10/19 231 lb 6.4 oz (105 kg)   10/22/18 254 lb 8 oz (115.4 kg)     Body mass index is 42.91 kg/m². CBC:   Lab Results   Component Value Date    WBC 14.6 07/21/2020    RBC 3.21 07/21/2020    HGB 7.7 07/22/2020    HCT 26.6 07/22/2020    MCV 68.2 07/21/2020    RDW 17.0 07/21/2020     07/21/2020       CMP:   Lab Results   Component Value Date     07/22/2020    K 4.3 07/22/2020     07/22/2020    CO2 27 07/22/2020    BUN 25 07/22/2020    CREATININE 0.9 07/22/2020    GFRAA >60 07/22/2020    LABGLOM >60 07/22/2020    GLUCOSE 65 07/22/2020    GLUCOSE 226 08/02/2011    PROT 6.5 10/09/2019    CALCIUM 8.6 07/22/2020    BILITOT <0.2 10/09/2019    ALKPHOS 77 10/09/2019    AST 29 10/09/2019    ALT 14 10/09/2019       POC Tests: No results for input(s): POCGLU, POCNA, POCK, POCCL, POCBUN, POCHEMO, POCHCT in the last 72 hours. Coags:   Lab Results   Component Value Date    PROTIME 17.7 10/11/2019    PROTIME 22.5 09/14/2016    INR 1.6 10/11/2019    APTT 49.2 10/10/2016       HCG (If Applicable):  No results found for: PREGTESTUR, PREGSERUM, HCG, HCGQUANT     ABGs: No results found for: PHART, PO2ART, KHO9MIB, ZXP3ZRM, BEART, U3KLOJIT     Type & Screen (If Applicable):  No results found for: LABABO, LABRH    Drug/Infectious Status (If Applicable):  No results found for: HIV, HEPCAB    COVID-19 Screening (If Applicable): No results found for: COVID19      Anesthesia Evaluation  Nursing notes reviewed no history of anesthetic complications:   Airway: Mallampati: III  TM distance: <3 FB   Neck ROM: limited  Mouth opening: > = 3 FB Dental:          Pulmonary: breath sounds clear to auscultation                             Cardiovascular:    (+) hypertension:,                   Neuro/Psych:               GI/Hepatic/Renal:   (+) GERD:, morbid obesity         ROS comment: GI bleeding. Endo/Other:    (+) DiabetesType II DM, , hypothyroidism: arthritis: OA., .                 Abdominal:           Vascular:   + PVD, aortic or cerebral, DVT, PE. Anesthesia Plan      MAC     ASA 3       Induction: intravenous. Anesthetic plan and risks discussed with patient. Use of blood products discussed with patient whom. Plan discussed with attending. Octaviano Portillo RN   7/22/2020      Pt seen, examined, chart reviewed, plan discussed.   Beatriz Vleasquez  7/22/2020  10:43 AM

## 2020-07-22 NOTE — PROGRESS NOTES
Floor Called, nurse to nurse given. Spoke with [employer] . Patients test results review, VS reported to receiving nurse. Any and all important information regarding patient disclosed.

## 2020-07-22 NOTE — OP NOTE
Operative Note      Patient: Yesy Sims  YOB: 1940  MRN: [id number]    Date of Procedure: 7/22/2020    Pre-Op Diagnosis: gastrointestinal bleeding    Post-Op Diagnosis: gastric polyps        Procedure(s):  EGD ESOPHAGOGASTRODUODENOSCOPY    Surgeon(s):  Mike Cox MD    Assistant:   Resident: Jacque Vasquez MD    Anesthesia: Monitor Anesthesia Care    Estimated Blood Loss (mL): Minimal    Complications: None    Specimens:   * No specimens in log *    Implants:  * No implants in log *      Drains:   Urethral Catheter (Active)       Urethral Catheter (Active)       [REMOVED] Urethral Catheter Latex 16 fr (Removed)       Findings: multiple gastric polyps, no source of bleeding    Detailed Description of Procedure: The patient was brought into the endoscopy suite and placed in the left lateral decubitus position. A bite block was placed in the patients mouth. After the initiation of LMAC anesthesia, a gastroscope was inserted into the patient's mouth and passed into the esophagus and into the stomach. Immediately upon entering the stomach the note of multiple gastric poylps was made. The scope was advanced through the pylorus into the first and second portion of the duodenum which was normal.  The scope was then withdrawn back into the stomach where it was retroflexed. There was no hiatal hernia noted. The scope was then straightened . The scope was then withdrawn the entire length of the esophagus, making the note of a normal esophagus without masses, ulcerations, or lesions noted. The GEJ was noted at 40cm and normal. The scope was withdrawn entirely. The patient tolerated the procedure well and there were no complications.   She was be prepped for colonoscopy for tomorrow    Dr. Eligha Homans was present for entire procedure    Electronically signed by Joel Coburn DO on 7/22/2020 at 10:42 AM

## 2020-07-22 NOTE — CARE COORDINATION
7/22/2020 Care Coordination - spoke with pt in room to discuss discharge planning. PCP is [facility]. Pharmacy is [email or web address] at [phone] and [facility]. Reports she lives with her  and her daughter. They help with providing her care. Pt is bedbound at home. Has a hospital bed, se lift and WC. She has [place]. She states they change her catheter every month. Lorri Brink from [place] - pt is active with them and will need resumption of care orders for Hugh Ruelas at discharge. Jose SABA at [facility]. Will need ambulance transportation home. Ambulance form and discharge papers in envelope placed on soft chart. SW/CM will follow.

## 2020-07-22 NOTE — ANESTHESIA POSTPROCEDURE EVALUATION
Department of Anesthesiology  Postprocedure Note    Patient: Shaji Simmons  MRN: [id number]  YOB: 1940  Date of evaluation: 7/22/2020  Time:  10:43 AM     Procedure Summary     Date:  07/22/20 Room / Location:  [facility] / [facility]    Anesthesia Start:  1010 Anesthesia Stop:  1752    Procedure:  EGD ESOPHAGOGASTRODUODENOSCOPY (N/A ) Diagnosis:  (.)    Surgeon:  Jacquie Schlatter, MD Responsible Provider:  Brenda Joseph MD    Anesthesia Type:  MAC ASA Status:  3          Anesthesia Type: No value filed. Johnathan Phase I: Johnathan Score: 8    Johnathan Phase II:      Last vitals: Reviewed and per EMR flowsheets.        Anesthesia Post Evaluation    Patient location during evaluation: PACU  Patient participation: complete - patient participated  Level of consciousness: awake  Pain score: 3  Airway patency: patent  Nausea & Vomiting: no nausea and no vomiting  Complications: no  Cardiovascular status: blood pressure returned to baseline  Respiratory status: acceptable  Hydration status: euvolemic

## 2020-07-23 NOTE — PROGRESS NOTES
Subjective: The patient is awake and alert. No acute events overnight. Denies chest pain, angina, SOB     Objective:    BP (!) 125/56   Pulse 90   Temp 96.8 °F (36 °C) (Temporal)   Resp 20   Ht 5' 4\" (1.626 m)   Wt 250 lb (113.4 kg)   SpO2 100%   BMI 42.91 kg/m²     In: 1750 [I.V.:1750]  Out: 1006     HEENT: NCAT,  PERRLA, No JVD  Heart:  RRR, no murmurs, gallops, or rubs. Lungs:  CTA bilaterally, no wheeze, rales or rhonchi  Abd: bowel sounds present, nontender, nondistended, no masses  Extrem:  No clubbing, cyanosis, or edema     Recent Labs     07/21/20  0851  07/22/20  1749 07/23/20  0143 07/23/20  0551   WBC 14.6*  --   --   --  7.9   HGB 5.7*   < > 7.5* 7.0* 7.4*  7.3*   HCT 21.9*   < > 26.7* 24.6* 26.0*  25.9*   *  --   --   --  395    < > = values in this interval not displayed.        Recent Labs     07/21/20  0851 07/22/20  0456 07/23/20  0547    143 141   K 4.5 4.3 4.2   CL 98 105 101   CO2 30* 27 29   BUN 29* 25* 17   CREATININE 0.9 0.9 0.8   CALCIUM 9.0 8.6 8.0*       Assessment:    Patient Active Problem List   Diagnosis    S/P IVC filter    History of DVT (deep vein thrombosis)    Morbid obesity ([address]    DKA (diabetic ketoacidosis) ([address]    Metabolic encephalopathy    Acute respiratory failure ([address]    Catheter-associated urinary tract infection ([address]    DM (diabetes mellitus), type 2, uncontrolled ([address]    Ambulatory dysfunction    Bacteriuria with pyuria    GI bleeding    Fundic gland polyps of stomach, benign       Plan:    Admit to tele for GIB/ fecal impaction   Stop oac , no chemical proph   H/h q 8 hrs X3   No antiplatelets   EGD w gastric polyp  C scope with proctitis and large amount of retained stool  - s/p disimpaction   improved hgb post 2 u prbc   Transfuse further for hgb <7  Monitor vitals   Resume home meds including basal insulin once tolerating po intake - hold if npo   Bolus regimen     Am labs   DVT Prophylaxis   PT/OT  Discharge planning All consultants notes reviewed    Belgica Nunez MD  3:18 PM  7/23/2020

## 2020-07-23 NOTE — ANESTHESIA PRE PROCEDURE
8.0 07/23/2020    BILITOT <0.2 10/09/2019    ALKPHOS 77 10/09/2019    AST 29 10/09/2019    ALT 14 10/09/2019     BMP    Lab Results   Component Value Date     07/23/2020    K 4.2 07/23/2020    K 4.3 07/22/2020     07/23/2020    CO2 29 07/23/2020    BUN 17 07/23/2020    CREATININE 0.8 07/23/2020    CALCIUM 8.0 07/23/2020    GFRAA >60 07/23/2020    LABGLOM >60 07/23/2020    GLUCOSE 53 07/23/2020    GLUCOSE 226 08/02/2011     POCGlucose  Recent Labs     07/21/20  0851 07/22/20  0456 07/23/20  0547   GLUCOSE 161* 65* 53*      Coags    Lab Results   Component Value Date    PROTIME 17.7 10/11/2019    PROTIME 22.5 09/14/2016    INR 1.6 10/11/2019    APTT 49.2 03/38/4966     HCG (If Applicable) No results found for: PREGTESTUR, PREGSERUM, HCG, HCGQUANT   ABGs No results found for: PHART, PO2ART, QWC2WNV, KJV4VSZ, BEART, U8PBZBNN   Type & Screen (If Applicable)  Lab Results   Component Value Date    ABORH B POS 07/21/2020     Radiology (If Applicable)  Cardiac Testing (If Applicable)   EKG (If Applicable)   Medications prior to admission:   Prior to Admission medications    Medication Sig Start Date End Date Taking? Authorizing Provider   bumetanide (BUMEX) 1 MG tablet Take 1 mg by mouth 2 times daily    Historical Provider, MD   gabapentin (NEURONTIN) 600 MG tablet Take 600 mg by mouth 2 times daily.     Historical Provider, MD   Magnesium 500 MG TABS Take 1 tablet by mouth daily    Historical Provider, MD   sucralfate (CARAFATE) 1 GM tablet Take 1 g by mouth 3 times daily    Historical Provider, MD   furosemide (LASIX) 20 MG tablet Take 20 mg by mouth daily    Historical Provider, MD   venlafaxine (EFFEXOR XR) 150 MG extended release capsule Take 150 mg by mouth nightly    Historical Provider, MD   Probiotic Acidophilus (FLORANEX) TABS Take 1 tablet by mouth daily    Historical Provider, MD   allopurinol (ZYLOPRIM) 300 MG tablet Take 1 tablet by mouth daily 10/12/19   An Sorensen MD   busPIRonviolette (BUSPAR) 7.5 MG tablet Take 7.5 mg by mouth 2 times daily     Historical Provider, MD   oxybutynin (DITROPAN-XL) 10 MG extended release tablet Take 10 mg by mouth 2 times daily    Historical Provider, MD   apixaban (ELIQUIS) 5 MG TABS tablet Take 5 mg by mouth 2 times daily    Historical Provider, MD   Multiple Vitamins-Minerals (PRESERVISION AREDS 2) CAPS Take 1 capsule by mouth 2 times daily     Historical Provider, MD   traZODone (DESYREL) 50 MG tablet Take 50 mg by mouth nightly    Historical Provider, MD   Cranberry-Vitamin C-Probiotic (AZO CRANBERRY) 250-30 MG TABS Take 2 tablets by mouth daily     Historical Provider, MD   insulin glargine (LANTUS) 100 UNIT/ML injection Inject 18 Units into the skin nightly     Historical Provider, MD   pravastatin (PRAVACHOL) 40 MG tablet Take 40 mg by mouth daily  11/7/13   Historical Provider, MD   pantoprazole (PROTONIX) 40 MG tablet Take 1 tablet by mouth daily. 7/6/13   Dahlia Catalan MD   levothyroxine (SYNTHROID) 150 MCG tablet Take 150 mcg by mouth daily     Historical Provider, MD   sitaGLIPtan (JANUVIA) 100 MG tablet Take 100 mg by mouth daily. Historical Provider, MD   glimepiride (AMARYL) 4 MG tablet Take 4 mg by mouth every morning. Historical Provider, MD       Current medications:    No current facility-administered medications for this visit. No current outpatient medications on file.      Facility-Administered Medications Ordered in Other Visits   Medication Dose Route Frequency Provider Last Rate Last Dose    0.9 % sodium chloride bolus  250 mL Intravenous Once Suzanna Keepers, DO        allopurinol (ZYLOPRIM) tablet 300 mg  300 mg Oral Daily Suzanna Keepers, DO   300 mg at 07/21/20 1836    busPIRone (BUSPAR) tablet 7.5 mg  7.5 mg Oral BID Suzanna Keepers, DO   7.5 mg at 07/22/20 1858    gabapentin (NEURONTIN) tablet 600 mg  600 mg Oral BID Suzanna Keepers, DO   600 mg at 07/22/20 2035    insulin glargine (LANTUS) injection vial 18 Units  18 Units Subcutaneous Nightly Wilhelmena Hawking, DO        levothyroxine (SYNTHROID) tablet 150 mcg  150 mcg Oral Daily Wilhelmena Hawking, DO   150 mcg at 07/22/20 1439    magnesium gluconate (MAGONATE) tablet 500 mg  500 mg Oral Daily Wilhelmena Hawking, DO   500 mg at 07/21/20 1836    oxybutynin (DITROPAN-XL) extended release tablet 10 mg  10 mg Oral BID Wilhelmena Hawking, DO   10 mg at 07/22/20 2034    pravastatin (PRAVACHOL) tablet 40 mg  40 mg Oral Daily Wilhelmena Hawking, DO   40 mg at 07/21/20 1836    lactobacillus (CULTURELLE) capsule 1 capsule  1 capsule Oral Daily Wilhelmena Hawking, DO   1 capsule at 07/21/20 1837    alogliptin (NESINA) tablet 6.25 mg  6.25 mg Oral Daily Wilhelmena Hawking, DO   6.25 mg at 07/21/20 1835    sucralfate (CARAFATE) tablet 1 g  1 g Oral TID Wilhelmena Hawking, DO   1 g at 07/22/20 2034    traZODone (DESYREL) tablet 50 mg  50 mg Oral Nightly Wilhelmena Hawking, DO   50 mg at 07/22/20 2034    venlafaxine (EFFEXOR XR) extended release capsule 150 mg  150 mg Oral Nightly Wilhelmena Hawking, DO   150 mg at 07/22/20 2034    0.9 % sodium chloride infusion   Intravenous Continuous Wilhelmena Hawking, DO        sodium chloride flush 0.9 % injection 10 mL  10 mL Intravenous 2 times per day Wilhelmena Hawking, DO   10 mL at 07/22/20 2034    sodium chloride flush 0.9 % injection 10 mL  10 mL Intravenous PRN Wilhelmena Hawking, DO        acetaminophen (TYLENOL) tablet 650 mg  650 mg Oral Q6H PRN Wilhelmena Hawking, DO        Or    acetaminophen (TYLENOL) suppository 650 mg  650 mg Rectal Q6H PRN Wilhelmena Hawking, DO        promethazine (PHENERGAN) tablet 12.5 mg  12.5 mg Oral Q6H PRN Wilhelmena Hawking, DO        Or    ondansetron [facility]) injection 4 mg  4 mg Intravenous Q6H PRN Wilhelmena Hawking, DO        pantoprazole (PROTONIX) injection 40 mg  40 mg Intravenous BID Wilhelmena Hawking, DO   40 mg at 07/22/20 2033       Allergies:     Allergies   Allergen Reactions    Ciprofloxacin     Codeine      Upset stomach    Nitorfurantoin Macrocrystalline [Nitrofurantoin]     Pcn [Penicillins] Hives    Sulfa Antibiotics Hives    Torsemide        Problem List:    Patient Active Problem List   Diagnosis Code    S/P IVC filter X26.879    History of DVT (deep vein thrombosis) Z86.718    Morbid obesity ([facility]) E66.01    DKA (diabetic ketoacidosis) ([facility]) A14.66    Metabolic encephalopathy U99.52    Acute respiratory failure ([facility]) J96.00    Catheter-associated urinary tract infection ([facility] T83.511A, N39.0    DM (diabetes mellitus), type 2, uncontrolled ([address] E11.65    Ambulatory dysfunction R26.2    Bacteriuria with pyuria R82.71, R82.81    GI bleeding K92.2    Fundic gland polyps of stomach, benign D13.1       Past Medical History:        Diagnosis Date    Diabetes mellitus ([address]     DVT (deep venous thrombosis) ([facility])     GERD (gastroesophageal reflux disease)     Herniated disc     History of DVT (deep vein thrombosis) 2014    Hyperlipidemia     Hypertension     Kidney calculi     Left leg DVT ([address] 11/15/2013    Osteoarthritis     PE (pulmonary embolism)     S/P carotid endarterectomy 2013    S/P IVC filter 2013    Sepsis(995.91)     Thyroid disease        Past Surgical History:        Procedure Laterality Date    APPENDECTOMY      BACK SURGERY      lower back kyphosis surgery    BREAST BIOPSY Left      SECTION      CHOLECYSTECTOMY      ENDOSCOPY, COLON, DIAGNOSTIC      HYSTERECTOMY      KYPHOSIS SURGERY  2013    T 12,  L1,  L2 BONE BIOPSY, EPIDURAL CATHETER    SALPINGO-OOPHORECTOMY      TONSILLECTOMY      URETER STENT PLACEMENT Bilateral 2010    has had this done twice       Social History:    Social History     Tobacco Use    Smoking status: Never Smoker    Smokeless tobacco: Never Used   Substance Use Topics    Alcohol use:  No                                Counseling given: Not Answered      Vital Signs (Current): There were no vitals filed for this visit. BP Readings from Last 3 Encounters:   07/23/20 (!) 140/55   07/22/20 (!) 155/65   10/11/19 127/61       NPO Status:                                                                                 BMI:   Wt Readings from Last 3 Encounters:   07/21/20 250 lb (113.4 kg)   10/10/19 231 lb 6.4 oz (105 kg)   10/22/18 254 lb 8 oz (115.4 kg)     There is no height or weight on file to calculate BMI.    CBC:   Lab Results   Component Value Date    WBC 7.9 07/23/2020    RBC 3.43 07/23/2020    HGB 7.3 07/23/2020    HGB 7.4 07/23/2020    HCT 25.9 07/23/2020    HCT 26.0 07/23/2020    MCV 75.8 07/23/2020    RDW 24.5 07/23/2020     07/23/2020       CMP:   Lab Results   Component Value Date     07/23/2020    K 4.2 07/23/2020    K 4.3 07/22/2020     07/23/2020    CO2 29 07/23/2020    BUN 17 07/23/2020    CREATININE 0.8 07/23/2020    GFRAA >60 07/23/2020    LABGLOM >60 07/23/2020    GLUCOSE 53 07/23/2020    GLUCOSE 226 08/02/2011    PROT 6.5 10/09/2019    CALCIUM 8.0 07/23/2020    BILITOT <0.2 10/09/2019    ALKPHOS 77 10/09/2019    AST 29 10/09/2019    ALT 14 10/09/2019       POC Tests: No results for input(s): POCGLU, POCNA, POCK, POCCL, POCBUN, POCHEMO, POCHCT in the last 72 hours.     Coags:   Lab Results   Component Value Date    PROTIME 17.7 10/11/2019    PROTIME 22.5 09/14/2016    INR 1.6 10/11/2019    APTT 49.2 10/10/2016       HCG (If Applicable): No results found for: PREGTESTUR, PREGSERUM, HCG, HCGQUANT     ABGs: No results found for: PHART, PO2ART, MZK6VBW, JTZ4RCP, BEART, C5TVUQOS     Type & Screen (If Applicable):  No results found for: LABABO, LABRH    Drug/Infectious Status (If Applicable):  No results found for: HIV, HEPCAB    COVID-19 Screening (If Applicable): No results found for: COVID19      Anesthesia Evaluation  Nursing notes reviewed no history of anesthetic complications:   Airway: Mallampati: III  TM

## 2020-07-23 NOTE — PROGRESS NOTES
Patient admitted to PACU from [facility]. All appropriate monitors applied, siderails up, bed locked, and in low position.

## 2020-07-23 NOTE — CARE COORDINATION
7/23/2020 Hgb 7.3 today. Pt had colonoscopy today. Discharge plan remains home when medically stable with [facility]. Will need resumption of [facility] care orders at discharge. Pt will need ambulance transport home. Ambulance form and discharge papers in envelope on soft chart. SW/CM will continue to follow.

## 2020-07-23 NOTE — ANESTHESIA POSTPROCEDURE EVALUATION
Department of Anesthesiology  Postprocedure Note    Patient: George Miller  MRN: [id number]  YOB: 1940  Date of evaluation: 7/23/2020  Time:  11:58 AM     Procedure Summary     Date:  07/23/20 Room / Location:  [facility] / [facility]    Anesthesia Start:  0429 Anesthesia Stop:  1132    Procedure:  COLONOSCOPY FLEXIBLE W/ DECOMPRESSION (N/A ) Diagnosis:  (/)    Surgeon:  Dee Reynoso MD Responsible Provider:  Lenin Dowell MD    Anesthesia Type:  MAC ASA Status:  3          Anesthesia Type: MAC    Johnathan Phase I: Johnathan Score: 8    Johnathan Phase II:      Last vitals: Reviewed and per EMR flowsheets.        Anesthesia Post Evaluation    Patient location during evaluation: PACU  Patient participation: complete - patient participated  Level of consciousness: awake  Pain score: 0  Airway patency: patent  Nausea & Vomiting: no nausea  Complications: no  Cardiovascular status: blood pressure returned to baseline  Respiratory status: acceptable  Hydration status: euvolemic

## 2020-07-23 NOTE — PROGRESS NOTES
Located within [facility]  PROGRESS NOTE  ATTENDING NOTE    Patient had a lot of retained stool during colonoscopy today. She was disimpacted twice by residents yesterday and once today under anesthesia with me. Will get a gastrograffin enema tomorrow to help continue evacuating her stool to prevent stercoral ulcer.     Lupe Cardona MD, MSc, FACS  7/23/2020  3:15 PM

## 2020-07-23 NOTE — H&P
Apolonia Sands M.D. History and Physical      CHIEF COMPLAINT: fatigue     Reason for Admission: anemia     History Obtained From: pt/emr     HISTORY OF PRESENT ILLNESS:      The patient is a 67 y.o. female of No primary care provider on file. with significant past medical history of dm, htn ,dvt  on eliquis   who presents with fatigue and weakness. She had black liquid stool at home. She is bed bound and does not ambulate at home. hgb near 5.7- transfused in ed. Admitted for surgery eval. On my eval, weak but no chest pain sob . Still having a large amount of diarrhea   BP (!) 125/56   Pulse 90   Temp 96.8 °F (36 °C) (Temporal)   Resp 20   Ht 5' 4\" (1.626 m)   Wt 250 lb (113.4 kg)   SpO2 100%   BMI 42.91 kg/m²   CT abd   Constipation with fecal impaction with  thickening and edema of the   rectosigmoid colon concerning for proctitis/colitis with the presacral   edema. There may be inflammation extending to involve the urinary   bladder resulting cystitis.      Progressive biliary dilatation  Past Medical History:        Diagnosis Date    Diabetes mellitus ([address]     DVT (deep venous thrombosis) (HCC)     GERD (gastroesophageal reflux disease)     Herniated disc     History of DVT (deep vein thrombosis) 2014    Hyperlipidemia     Hypertension     Kidney calculi     Left leg DVT ([address] 11/15/2013    Osteoarthritis     PE (pulmonary embolism)     S/P carotid endarterectomy 2013    S/P IVC filter 2013    Sepsis(995.91)     Thyroid disease      Past Surgical History:        Procedure Laterality Date    APPENDECTOMY      BACK SURGERY      lower back kyphosis surgery    BREAST BIOPSY Left      SECTION      CHOLECYSTECTOMY      ENDOSCOPY, COLON, DIAGNOSTIC      HYSTERECTOMY      KYPHOSIS SURGERY  2013    T 12,  L1,  L2 BONE BIOPSY, EPIDURAL CATHETER    SALPINGO-OOPHORECTOMY      TONSILLECTOMY      URETER STENT PLACEMENT Bilateral 2010    has had this done twice         Medications Prior to Admission:    Medications Prior to Admission: bumetanide (BUMEX) 1 MG tablet, Take 1 mg by mouth 2 times daily  gabapentin (NEURONTIN) 600 MG tablet, Take 600 mg by mouth 2 times daily. Magnesium 500 MG TABS, Take 1 tablet by mouth daily  sucralfate (CARAFATE) 1 GM tablet, Take 1 g by mouth 3 times daily  furosemide (LASIX) 20 MG tablet, Take 20 mg by mouth daily  venlafaxine (EFFEXOR XR) 150 MG extended release capsule, Take 150 mg by mouth nightly  Probiotic Acidophilus (FLORANEX) TABS, Take 1 tablet by mouth daily  allopurinol (ZYLOPRIM) 300 MG tablet, Take 1 tablet by mouth daily  busPIRone (BUSPAR) 7.5 MG tablet, Take 7.5 mg by mouth 2 times daily   oxybutynin (DITROPAN-XL) 10 MG extended release tablet, Take 10 mg by mouth 2 times daily  apixaban (ELIQUIS) 5 MG TABS tablet, Take 5 mg by mouth 2 times daily  Multiple Vitamins-Minerals (PRESERVISION AREDS 2) CAPS, Take 1 capsule by mouth 2 times daily   traZODone (DESYREL) 50 MG tablet, Take 50 mg by mouth nightly  Cranberry-Vitamin C-Probiotic (AZO CRANBERRY) 250-30 MG TABS, Take 2 tablets by mouth daily   insulin glargine (LANTUS) 100 UNIT/ML injection, Inject 18 Units into the skin nightly   pravastatin (PRAVACHOL) 40 MG tablet, Take 40 mg by mouth daily   pantoprazole (PROTONIX) 40 MG tablet, Take 1 tablet by mouth daily. levothyroxine (SYNTHROID) 150 MCG tablet, Take 150 mcg by mouth daily   sitaGLIPtan (JANUVIA) 100 MG tablet, Take 100 mg by mouth daily. glimepiride (AMARYL) 4 MG tablet, Take 4 mg by mouth every morning. Allergies:  Ciprofloxacin; Codeine; Nitorfurantoin macrocrystalline [nitrofurantoin]; Pcn [penicillins]; Sulfa antibiotics; and Torsemide    Social History:   TOBACCO:   reports that she has never smoked. She has never used smokeless tobacco.  ETOH:   reports no history of alcohol use.   MARITAL STATUS:    OCCUPATION:      Family History:       Problem Relation Age of Onset hours. No results for input(s): CKTOTAL, CKMB, CKMBINDEX, TROPONINI in the last 72 hours. ASSESSMENT:      Active Problems:    GI bleeding    Fundic gland polyps of stomach, benign  Resolved Problems:    * No resolved hospital problems. *        PLAN:    Admit to tele for GIB  Stop oac , no chemical proph   H/h q 8 hrs X3   No antiplatelets   EGD w gastric polyp  C scope   improved hgb post 2 u prbc   Transfuse further for hgb <7  Monitor vitals   Resume home meds including basal insulin once tolerating po intake - hold if npo   Bolus regimen   ?  Fecal impaction   Am labs     DVT Proph  Pt/OT  Dc plan         Chaparro Carranza MD  7/22/2020  9:21 PM

## 2020-07-24 NOTE — CARE COORDINATION
Hgb 6.9, for transfusion today. [address] following, resume orders in the chart when stable. Will need ambulance transport home. Form on soft chart. For questions I can be reached at [phone].  Katherine Costa [place]

## 2020-07-24 NOTE — PROGRESS NOTES
Patient's  would like the doctors to call him with updates. [phone].  Electronically signed by Heaven Coley RN on 7/24/2020 at 4:22 PM

## 2020-07-24 NOTE — PROGRESS NOTES
Subjective: The patient is awake and alert. Feels well  Denies any complaints  Unable to tell me if she is having any further diarrhea    Objective:    /60   Pulse 88   Temp 97.3 °F (36.3 °C) (Temporal)   Resp 18   Ht 5' 4\" (1.626 m)   Wt 250 lb (113.4 kg)   SpO2 98%   BMI 42.91 kg/m²     In: 360 [P.O.:360]  Out: 1750     HEENT: NCAT,  PERRLA, No JVD  Heart:  RRR, no murmurs, gallops, or rubs.   Lungs:  CTA bilaterally, no wheeze, rales or rhonchi  Abd: bowel sounds present, nontender, nondistended, no masses  Extrem:  No clubbing, cyanosis, or edema     Recent Labs     07/23/20  1506 07/23/20  2109 07/24/20  0452   WBC 9.7 8.8 8.4   HGB 7.1* 6.6* 6.9*   HCT 25.5* 23.2* 24.2*    355 361       Recent Labs     07/22/20  0456 07/23/20  0547 07/24/20  0452    141 141   K 4.3 4.2 3.8    101 102   CO2 27 29 30*   BUN 25* 17 11   CREATININE 0.9 0.8 0.7   CALCIUM 8.6 8.0* 8.0*       Assessment:    Patient Active Problem List   Diagnosis    S/P IVC filter    History of DVT (deep vein thrombosis)    Morbid obesity (HCC)    DKA [facility]diabetic k[facility]acidosis) ([facility])    Metabolic encephalopathy    Acute respiratory failure ([facility])    C[facility]ter-associated [facility] tract infection ([facility]    DM (diabetes mellitus), type 2, uncontrolled ([facility]    Ambulatory dysfunction    Bacteriuria with pyuria    GI bleeding    Fundic gland polyps of stomach, benign       Plan:    Admit to tele for GIB/ fecal impaction   Stop oac , no chemical proph   H/h q 8 hrs X3   No antiplatelets   EGD w gastric polyp  C scope with proctitis and large amount of retained stool  - s/p disimpaction   Hemoglobin 6.9 again today-transfuse  Undergoing fluoroscopy today along with enemas  Monitor vitals-stable  Resume home meds including basal insulin once tolerating po intake - hold if npo   Bolus regimen     Am labs   DVT Prophylaxis   PT/OT  Discharge planning       All consultants notes reviewed    Maykel Guaman MD  11:27

## 2020-07-25 NOTE — PROGRESS NOTES
Subjective: The patient is awake and alert. Feels well  Denies any complaints  Unable to tell me if she is having any further diarrhea    Objective:    BP (!) 141/64   Pulse 80   Temp 98.6 °F (37 °C) (Oral)   Resp 18   Ht 5' 4\" (1.626 m)   Wt 250 lb (113.4 kg)   SpO2 96% Comment: 2L  BMI 42.91 kg/m²     In: 1070 [P.O.:760; I.V.:310]  Out: 1900     HEENT: NCAT,  PERRLA, No JVD  Heart:  RRR, no murmurs, gallops, or rubs.   Lungs:  CTA bilaterally, no wheeze, rales or rhonchi  Abd: bowel sounds present, nontender, nondistended, no masses  Extrem:  No clubbing, cyanosis, or edema     Recent Labs     07/24/20  1448 07/24/20  2202 07/25/20  0315   WBC 12.7* 9.9 8.1   HGB 7.0* 6.6* 6.7*   HCT 25.0* 23.3* 23.6*    373 341       Recent Labs     07/23/20  0547 07/24/20  0452 07/25/20  0314    141 140   K 4.2 3.8 4.1    102 102   CO2 29 30* 32*   BUN 17 11 8   CREATININE 0.8 0.7 0.7   CALCIUM 8.0* 8.0* 8.2*       Assessment:    Patient Active Problem List   Diagnosis    S/P IVC filter    History of DVT (deep vein thrombosis)    Morbid obesity (HCC)    [facility]    Metabolic encephalopathy    Acute respiratory failure ([facility])    [facility] infection [facility]    DM (diabetes mellitus), type 2, uncontrolled ([facility]    Ambulatory dysfunction    Bacteriuria with pyuria    GI bleeding    Fundic gland polyps of stomach, benign       Plan:    Admit to tele for GIB/ fecal impaction   Stop oac , no chemical proph   H/h q 8 hrs - still dropping in spite of transfusions   No antiplatelets   EGD w gastric polyp  C scope with proctitis and large amount of retained stool  - s/p disimpaction   Hemoglobin 6.7 again today-transfuse  Undergoing fluoroscopy today along with enemas  Monitor vitals-stable  Resume home meds including basal insulin once tolerating po intake - hold if npo   Bolus regimen     Am labs   DVT Prophylaxis   PT/OT  Discharge planning All consultants notes reviewed    Romana Silence, MD  12:12 PM  7/25/2020

## 2020-07-25 NOTE — PROGRESS NOTES
[facility]  PROGRESS NOTE  ATTENDING NOTE    No complaints of abdominal pain  Patient is morbidly obese and bed bound at home  She states she may have a bowel movement once per week  Gastrograffin enema today to help with bowel movements. States she had a colonoscopy 1 year ago, but I do not see in our records.     She still needs a colonoscopy though  Leave on clears and give miralax and lactulose    Jackson Christianson MD, MSc, FACS  7/24/2020  11:09 PM

## 2020-07-25 NOTE — PROGRESS NOTES
Spoke with blood bank to request the ordered unit of prbcs. It is not ready, blood bank will call then it is available. Surgical resident updated.

## 2020-07-25 NOTE — PROGRESS NOTES
[facility]  PROGRESS NOTE  ATTENDING NOTE    I explained to patient the importance of trying to get a colonoscopy done. She was reluctant at first but agrees to do a miralax prep. She is most worried about food. She says she will only do 2 days of clears and that is all. Will start Miralax today and plan for colonoscopy on Monday.     Elida Luke MD, MSc, FACS  7/25/2020  12:14 PM

## 2020-07-26 NOTE — PROGRESS NOTES
Subjective: The patient is awake and alert. Feels well  Denies any complaints      Objective:    BP (!) 130/59   Pulse 91   Temp 98.2 °F (36.8 °C) (Temporal)   Resp 18   Ht 5' 4\" (1.626 m)   Wt 250 lb (113.4 kg)   SpO2 97%   BMI 42.91 kg/m²     In: 1560 [P.O.:1560]  Out: 1000     HEENT: NCAT,  PERRLA, No JVD  Heart:  RRR, no murmurs, gallops, or rubs.   Lungs:  CTA bilaterally, no wheeze, rales or rhonchi  Abd: bowel sounds present, nontender, nondistended, no masses  Extrem:  No clubbing, cyanosis, or edema     Recent Labs     07/25/20  1801 07/25/20  2214 07/26/20  0629   WBC 6.4 8.1 7.8   HGB 8.9* 9.0* 7.8*   HCT 30.9* 31.4* 27.4*    350 322       Recent Labs     07/24/20  0452 07/25/20  0314 07/26/20  0629    140 144   K 3.8 4.1 3.3*    102 106   CO2 30* 32* 29   BUN 11 8 5*   CREATININE 0.7 0.7 0.7   CALCIUM 8.0* 8.2* 8.3*       Assessment:    Patient Active Problem List   Diagnosis    S/P IVC filter    History of DVT (deep vein thrombosis)    Morbid obesity (HCC)    DKA (diabetic ketoacidosis) (HCC)    Metabolic encephalopathy    Acute respiratory failure (HCC)    [facility]    DM (diabetes mellitus), type 2, uncontrolled ([facility]    Ambulatory dysfunction    Bacteriuria with pyuria    GI bleeding    Fundic gland polyps of stomach, benign       Plan:    Admit to tele for GIB/ fecal impaction   Stop oac , no chemical proph   H/h q 8 hrs - still dropping in spite of transfusions   No antiplatelets   EGD w gastric polyp  sigmoidoscopy with proctitis and large amount of retained stool  - s/p disimpaction   Dropped from 9 to 7.8 inspite of transfusion   Await full cs cope - tomorrow  Monitor vitals-stable  Resume home meds including basal insulin once tolerating po intake - hold if npo   Bolus regimen     Am labs   DVT Prophylaxis   PT/OT  Discharge planning       All consultants notes reviewed    Mami Curran MD  9:57 AM  7/26/2020

## 2020-07-27 NOTE — CARE COORDINATION
7/27/2020 For colonoscopy today. Hgb 8.4 today. Discharge plan remains to return home with family when medically stable. Will need ambulance transport set up for ride home. SW/VERNA will continue to follow.

## 2020-07-27 NOTE — ANESTHESIA PRE PROCEDURE
Department of Anesthesiology  Preprocedure Note       Name:  Waqas Sanon   Age:  67 y.o.  :  1940                                          MRN:  [id number]         Date:  2020      Surgeon: Praveena Biswas):  Sophy Billings MD    Procedure: Procedure(s):  COLONOSCOPY DIAGNOSTIC    Medications prior to admission:   Prior to Admission medications    Medication Sig Start Date End Date Taking? Authorizing Provider   bumetanide (BUMEX) 1 MG tablet Take 1 mg by mouth 2 times daily   Yes Historical Provider, MD   gabapentin (NEURONTIN) 600 MG tablet Take 600 mg by mouth 2 times daily.    Yes Historical Provider, MD   Magnesium 500 MG TABS Take 1 tablet by mouth daily   Yes Historical Provider, MD   sucralfate (CARAFATE) 1 GM tablet Take 1 g by mouth 3 times daily   Yes Historical Provider, MD   furosemide (LASIX) 20 MG tablet Take 20 mg by mouth daily   Yes Historical Provider, MD   venlafaxine (EFFEXOR XR) 150 MG extended release capsule Take 150 mg by mouth nightly   Yes Historical Provider, MD   Probiotic Acidophilus (FLORANEX) TABS Take 1 tablet by mouth daily   Yes Historical Provider, MD   allopurinol (ZYLOPRIM) 300 MG tablet Take 1 tablet by mouth daily 10/12/19  Yes Domenica Jackson MD   busPIRone (BUSPAR) 7.5 MG tablet Take 7.5 mg by mouth 2 times daily    Yes Historical Provider, MD   oxybutynin (DITROPAN-XL) 10 MG extended release tablet Take 10 mg by mouth 2 times daily   Yes Historical Provider, MD   apixaban (ELIQUIS) 5 MG TABS tablet Take 5 mg by mouth 2 times daily   Yes Historical Provider, MD   Multiple Vitamins-Minerals (PRESERVISION AREDS 2) CAPS Take 1 capsule by mouth 2 times daily    Yes Historical Provider, MD   traZODone (DESYREL) 50 MG tablet Take 50 mg by mouth nightly   Yes Historical Provider, MD   Cranberry-Vitamin C-Probiotic (AZO CRANBERRY) 250-30 MG TABS Take 2 tablets by mouth daily    Yes Historical Provider, MD   insulin glargine (LANTUS) 100 UNIT/ML injection Inject 18 Units Carolee Almonte MD   10 mg at 07/26/20 2001    pravastatin (PRAVACHOL) tablet 40 mg  40 mg Oral Daily Priya Alcaraz MD   40 mg at 07/26/20 0830    lactobacillus (CULTURELLE) capsule 1 capsule  1 capsule Oral Daily Priya Alcaraz MD   1 capsule at 07/26/20 0830    alogliptin (NESINA) tablet 6.25 mg  6.25 mg Oral Daily Priya Alcaraz MD   Stopped at 07/23/20 0932    sucralfate (CARAFATE) tablet 1 g  1 g Oral TID Priya Alcaraz MD   1 g at 07/26/20 2001    traZODone (DESYREL) tablet 50 mg  50 mg Oral Nightly Priya Alcaraz MD   50 mg at 07/25/20 2012    venlafaxine (EFFEXOR XR) extended release capsule 150 mg  150 mg Oral Nightly Priya Alcaraz MD   150 mg at 07/26/20 2001    sodium chloride flush 0.9 % injection 10 mL  10 mL Intravenous 2 times per day Priya Alcaraz MD   10 mL at 07/26/20 2002    sodium chloride flush 0.9 % injection 10 mL  10 mL Intravenous PRN Priya Alcaraz MD        acetaminophen (TYLENOL) tablet 650 mg  650 mg Oral Q6H PRN Priya Alcaraz MD        Or   Lana Austin acetaminophen (TYLENOL) suppository 650 mg  650 mg Rectal Q6H PRN Priya Alcaraz MD        [facility] injection 4 mg  4 mg Intravenous Q6H PRN Priya Alcaraz MD        pantoprazole (PROTONIX) injection 40 mg  40 mg Intravenous BID Priya Alcaraz MD   40 mg at 07/26/20 2001       Allergies:     Allergies   Allergen Reactions    Ciprofloxacin     Codeine      Upset stomach    Nitorfurantoin Macrocrystalline [Nitrofurantoin]     Pcn [Penicillins] Hives    Sulfa Antibiotics Hives    Torsemide        Problem List:    Patient Active Problem List   Diagnosis Code    S/P IVC filter A13.718    History of DVT (deep vein thrombosis) Z86.718    Morbid obesity (HCC) E66.01    DKA [facility]osis) [facility] Q49.77    Metabolic encephalopathy X87.51    Acute respiratory failure (HCC) J96.00    Catheter-associated urinary tract infection ([facility]) T83.511A, N39.0    DM (diabetes mellitus), type 2, uncontrolled ([facility] E11.65    Ambulatory dysfunction R26.2    Bacteriuria with pyuria R82.71, R82.81    GI bleeding K92.2    Fundic gland polyps of stomach, benign D13.1       Past Medical History:        Diagnosis Date    Diabetes mellitus ([facility]     DVT (deep venous thrombosis) (HCC)     GERD (gastroesophageal reflux disease)     Herniated disc     History of DVT (deep vein thrombosis) 2014    Hyperlipidemia     Hypertension     Kidney calculi     Left leg DVT ([facility] 11/15/2013    Osteoarthritis     PE (pulmonary embolism)     S/P carotid endarterectomy 2013    S/P IVC filter 2013    Sepsis(995.91)     Thyroid disease        Past Surgical History:        Procedure Laterality Date    APPENDECTOMY      BACK SURGERY      lower back kyphosis surgery    BREAST BIOPSY Left      SECTION      CHOLECYSTECTOMY      COLONOSCOPY N/A 2020    COLONOSCOPY FLEXIBLE W/ DECOMPRESSION performed by Kalina East MD at [facility], COLON, DIAGNOSTIC      HYSTERECTOMY      KYPHOSIS SURGERY  2013    T 12,  L1,  L2 BONE BIOPSY, EPIDURAL CATHETER    SALPINGO-OOPHORECTOMY      TONSILLECTOMY      UPPER GASTROINTESTINAL ENDOSCOPY N/A 2020    EGD ESOPHAGOGASTRODUODENOSCOPY performed by Kalina East MD at [address] [employer] Bilateral     has had this done twice       Social History:    Social History     Tobacco Use    Smoking status: Never Smoker    Smokeless tobacco: Never Used   Substance Use Topics    Alcohol use:  No                                Counseling given: Not Answered      Vital Signs (Current):   Vitals:    20 2323 20 0800 20 1331 20 2324   BP: (!) 145/63 (!) 130/59 (!) 122/58 128/62   Pulse: 92 91 85 93   Resp: 19 18 18 19   Temp: 36.4 °C (97.5 °F) 36.8 °C (98.2 °F) 36.4 °C (97.6 °F) 36.6 °C (97.9 °F)   TempSrc: Temporal Temporal Temporal Temporal   SpO2: 97%  98% 97%   Weight: Height:                                                  BP Readings from Last 3 Encounters:   07/26/20 128/62   07/23/20 103/70   07/22/20 (!) 155/65       NPO Status:  > 8 hrs                                                                               BMI:   Wt Readings from Last 3 Encounters:   07/21/20 250 lb (113.4 kg)   10/10/19 231 lb 6.4 oz (105 kg)   10/22/18 254 lb 8 oz (115.4 kg)     Body mass index is 42.91 kg/m². CBC:   Lab Results   Component Value Date    WBC 8.2 07/27/2020    RBC 3.81 07/27/2020    HGB 8.4 07/27/2020    HCT 29.6 07/27/2020    MCV 77.7 07/27/2020    RDW 25.9 07/27/2020     07/27/2020       CMP:   Lab Results   Component Value Date     07/27/2020    K 3.4 07/27/2020    K 4.3 07/22/2020     07/27/2020    CO2 30 07/27/2020    BUN 4 07/27/2020    CREATININE 0.6 07/27/2020    GFRAA >60 07/27/2020    LABGLOM >60 07/27/2020    GLUCOSE 50 07/27/2020    GLUCOSE 226 08/02/2011    PROT 6.5 10/09/2019    CALCIUM 8.3 07/27/2020    BILITOT <0.2 10/09/2019    ALKPHOS 77 10/09/2019    AST 29 10/09/2019    ALT 14 10/09/2019       POC Tests: No results for input(s): POCGLU, POCNA, POCK, POCCL, POCBUN, POCHEMO, POCHCT in the last 72 hours.     Coags:   Lab Results   Component Value Date    PROTIME 17.7 10/11/2019    PROTIME 22.5 09/14/2016    INR 1.6 10/11/2019    APTT 49.2 10/10/2016       HCG (If Applicable): No results found for: PREGTESTUR, PREGSERUM, HCG, HCGQUANT     ABGs: No results found for: PHART, PO2ART, PVU0WOT, MHC2JWA, BEART, G4LMKESL     Type & Screen (If Applicable):  No results found for: LABABO, LABRH    Drug/Infectious Status (If Applicable):  No results found for: HIV, HEPCAB    COVID-19 Screening (If Applicable): No results found for: COVID19      Anesthesia Evaluation  Patient summary reviewed and Nursing notes reviewed  Airway: Mallampati: III  TM distance: >3 FB   Neck ROM: full  Mouth opening: > = 3 FB Dental:          Pulmonary:Negative Pulmonary ROS (+) decreased breath sounds,                             Cardiovascular:    (+) hypertension: mild, hyperlipidemia        Rhythm: regular  Rate: normal           Beta Blocker:  Not on Beta Blocker         Neuro/Psych:   Negative Neuro/Psych ROS              GI/Hepatic/Renal:   (+) GERD: well controlled, renal disease: kidney stones,          ROS comment: GI bleed. Endo/Other:    (+) Diabetes (FBS 50)Type II DM, using insulin, hypothyroidism: arthritis: OA., .                 Abdominal:   (+) obese,     Abdomen: soft. Vascular:   + PVD, aortic or cerebral, DVT, PE.        ROS comment: S/P Carotid endarterectomy   S/P IVC filter. Anesthesia Plan      MAC     ASA 4             Anesthetic plan and risks discussed with patient. Use of blood products discussed with patient whom consented to blood products. Plan discussed with CRNA and attending. Avis Christina RN   7/27/2020      Patient seen and examined, chart reviewed, agree with above findings. Anesthetic plan, risks, benefits, alternatives, and personnel involved discussed with patient. Patient verbalized an understanding and agreed to proceed. NPO status confirmed. Anesthetic plan discussed with care team members and agreed upon.     Liu Atwood DO   7/27/2020  9:59 AM

## 2020-07-27 NOTE — OP NOTE
Operative Note      Patient: Erma De La Cruz  YOB: 1940  MRN: [id number]    Date of Procedure: 7/27/2020    Pre-Op Diagnosis: GIB    Post-Op Diagnosis: Same and diverticula starting at 50 cm       Procedure(s):  COLONOSCOPY DIAGNOSTIC    Surgeon(s):  Luz Quintero MD    Assistant:   * No surgical staff found *    Anesthesia: Monitor Anesthesia Care    Estimated Blood Loss (mL): none    Complications: None    Specimens:   * No specimens in log *    Implants:  * No implants in log *      Drains:   NG/OG/NJ/NE Tube Left nostril (Active)       Urethral Catheter (Active)   Catheter Indications Other (specify) 07/26/20 2325   Site Assessment No urethral drainage;Moist 07/26/20 2325   Urine Color Yellow 07/27/20 0650   Urine Appearance Cloudy 07/27/20 0650   Urine Odor Malodorous 07/26/20 2325   Output (mL) 550 mL 07/27/20 0650       [REMOVED] Urethral Catheter Latex 16 fr (Removed)       [REMOVED] Urethral Catheter (Removed)       Findings: diverticula starting at 50 cm    Detailed Description of Procedure:   COLONOSCOPY PROCEDURE NOTE    BRIEF HISTORY:  This is a 67 y.o. female who presents with the complaint of GI bleed. It was recommended the patient undergo a colonoscopy. The risks/benefits/alternatives/expected outcomes were explained the the patient. The patient verbalized understanding and agreed to proceed. PROCEDURE:  The patient was brought into the endoscopy suite and placed in the left lateral decubitus position. A digital rectal exam was performed after the initiation of LMAC anesthesia and failed to reveal any obstructing masses or lesions. A colonoscope was inserted into the patient's anus and passed through the rectum, sigmoid, descending, transverse, and ascending colon. The cecum was seen but not able to be entered after multiple attempts. The scope was then withdrawn the entire length of the colon.   There were no masses, polyps, or lesions noted until reaching 50 cm where scattered diverticula were seen. Upon reaching the anus, the scope was retroflexed. There were no significant hemorrhoids noted. The scope was straightened and withdrawn entirely. The patient tolerated the procedure well and there were no complications. Prep was marginal with retained stool throughout the colon. No repeat due to age.       Teto Yee MD  7/27/2020      Electronically signed by Teto Yee MD on 7/27/2020 at 11:38 AM

## 2020-07-27 NOTE — PLAN OF CARE
Problem: Falls - Risk of:  Goal: Will remain free from falls  Description: Will remain free from falls  7/27/2020 1826 by Miah Villanueva RN  Outcome: Met This Shift  7/27/2020 0652 by Eddi Goldman RN  Outcome: Met This Shift  Goal: Absence of physical injury  Description: Absence of physical injury  7/27/2020 1826 by Miah Villanueva RN  Outcome: Met This Shift  7/27/2020 0652 by Eddi Goldman RN  Outcome: Met This Shift     Problem: Skin Integrity:  Goal: Will show no infection signs and symptoms  Description: Will show no infection signs and symptoms  Outcome: Met This Shift  Goal: Absence of new skin breakdown  Description: Absence of new skin breakdown  Outcome: Met This Shift
Problem: Falls - Risk of:  Goal: Will remain free from falls  Description: Will remain free from falls  7/27/2020 7348 by Solitario Gray RN  Outcome: Met This Shift  7/26/2020 2204 by Solitario Gray RN  Outcome: Met This Shift  Goal: Absence of physical injury  Description: Absence of physical injury  7/27/2020 2827 by Solitario Gray RN  Outcome: Met This Shift  7/26/2020 2204 by Solitario Gray RN  Outcome: Met This Shift
Problem: Falls - Risk of:  Goal: Will remain free from falls  Description: Will remain free from falls  Outcome: Met This Shift  Goal: Absence of physical injury  Description: Absence of physical injury  Outcome: Met This Shift
Problem: Falls - Risk of:  Goal: Will remain free from falls  Description: Will remain free from falls  Outcome: Met This Shift  Goal: Absence of physical injury  Description: Absence of physical injury  Outcome: Met This Shift
Yes

## 2020-07-27 NOTE — PROGRESS NOTES
To pacu for brief observation, patient lmac anesthesia,all appropriate monitors on, car locked and in low position, all siderails up.  Ritesh Arcos

## 2020-07-27 NOTE — PROGRESS NOTES
Subjective: The patient is awake and alert. Feels well  Denies any complaints  Resting comfortably no apparent acute distress  Going for colonoscopy today      Objective:    /62   Pulse 93   Temp 97.9 °F (36.6 °C) (Temporal)   Resp 19   Ht 5' 4\" (1.626 m)   Wt 250 lb (113.4 kg)   SpO2 97%   BMI 42.91 kg/m²     In: 1000 [P.O.:1000]  Out: 1425     HEENT: NCAT,  PERRLA, No JVD  Heart:  RRR, no murmurs, gallops, or rubs.   Lungs:  CTA bilaterally, no wheeze, rales or rhonchi  Abd: bowel sounds present, nontender, nondistended, no masses  Extrem:  No clubbing, cyanosis, or edema     Recent Labs     07/26/20  1417 07/27/20  0104 07/27/20  0452   WBC 6.2 8.0 8.2   HGB 8.3* 8.6* 8.4*   HCT 30.0* 30.7* 29.6*    335 325       Recent Labs     07/25/20  0314 07/26/20  0629 07/27/20  0452    144 142   K 4.1 3.3* 3.4*    106 105   CO2 32* 29 30*   BUN 8 5* 4*   CREATININE 0.7 0.7 0.6   CALCIUM 8.2* 8.3* 8.3*       Assessment:    Patient Active Problem List   Diagnosis    S/P IVC filter    History of DVT (deep vein thrombosis)    Morbid obesity (HCC)    [facility]    Metabolic encephalopathy    Acute respiratory failure (HCC)    [facility] tract infection ([facility]    DM (diabetes mellitus), type 2, uncontrolled ([facility])    Ambulatory dysfunction    Bacteriuria with pyuria    GI bleeding    Fundic gland polyps of stomach, benign       Plan:    Admit to tele for GIB/ fecal impaction   Stop oac , no chemical proph   H/h q 8 hrs - still dropping in spite of transfusions   No antiplatelets   EGD w gastric polyp  sigmoidoscopy with proctitis and large amount of retained stool  - s/p disimpaction   Dropped from 9 to 7.8 inspite of transfusion   Await full cs cope -  today  Supplement potassium  Monitor vitals-stable  Resume home meds including basal insulin once tolerating po intake - hold if npo   Bolus regimen     Am labs   DVT Prophylaxis PT/OT  Discharge planning       All consultants notes reviewed    Nguyen Rob MD  8:25 AM  7/27/2020

## 2020-07-28 NOTE — CARE COORDINATION
7/28/2020 - for discharge today. Plan remains discharge home with family and [address]. Placed call to Lauren Hart at [facility] to inform her of discharge. Called and set up transportation home through [address] will be here to transport home at 6 pm. Discharge paperwork in envelope on soft chart.

## 2020-07-28 NOTE — DISCHARGE SUMMARY
Physician Discharge Summary     Patient ID:  Megan Quintero  [id number]  67 y.o.  1940    Admit date: 7/21/2020    Discharge date and time: 7/28/2020    Admission Diagnoses:   Patient Active Problem List   Diagnosis    S/P IVC filter    History of DVT (deep vein thrombosis)    Morbid obesity ([facility]    DKA (diabetic ketoacidosis) ([facility]    Metabolic encephalopathy    Acute respiratory failure ([facility]    Catheter-associated urinary tract infection ([facility]    DM (diabetes mellitus), type 2, uncontrolled ([facility]    Ambulatory dysfunction    Bacteriuria with pyuria    GI bleeding    Fundic gland polyps of stomach, benign       Discharge Diagnoses: fecal impaction , GIB , sever constipation     Consults: {gen surg     Procedures: sigmoidoscopy, Cscope , manual disimpactions     Hospital Course:    The patient is a 67 y.o. female of No primary care provider on file. with significant past medical history of dm, htn ,dvt  on eliquis   who presents with fatigue and weakness. She had black liquid stool at home. She is bed bound and does not ambulate at home. hgb near 5.7- transfused in ed. Admitted for surgery eval. On my eval, weak but no chest pain sob . Still having a large amount of diarrhea   BP (!) 125/56   Pulse 90   Temp 96.8 °F (36 °C) (Temporal)   Resp 20   Ht 5' 4\" (1.626 m)   Wt 250 lb (113.4 kg)   SpO2 100%   BMI 42.91 kg/m²   CT abd   Constipation with fecal impaction with  thickening and edema of the   rectosigmoid colon concerning for proctitis/colitis with the presacral   edema. There may be inflammation extending to involve the urinary   bladder resulting cystitis.      Progressive biliary dilatation    Admit to tele for GIB/ fecal impaction   Stop oac , no chemical proph   H/h q 8 hrs - still dropping in spite of transfusions   No antiplatelets   EGD w gastric polyp  sigmoidoscopy with proctitis and large amount of retained stool  - s/p disimpaction   hgb stable in mid 8's   Cs cope with diverticula   Monitor vitals-stable  Resume home meds including basal insulin once tolerating po intake - hold if npo   Bolus regimen   Stop WLiquis for now- may consider resuming in future with coumadin given GIB        Recent Labs     20  2144 20  0438 20  1430   WBC 8.8 9.0 9.0   HGB 8.7* 8.2* 8.6*   HCT 31.1* 29.1* 29.5*    334 297       Recent Labs     20  0629 20  0452 20  0438    142 139   K 3.3* 3.4* 4.6    105 105   CO2 29 30* 25   BUN 5* 4* 5*   CREATININE 0.7 0.6 0.9   CALCIUM 8.3* 8.3* 8.0*       Ct Abdomen Pelvis W Iv Contrast Additional Contrast? None    Result Date: 2020  Patient MRN:  [id number] : 1940 Age: 67 years Gender: Female Order Date:  2020 8:45 AM EXAM: CT ABDOMEN PELVIS W IV CONTRAST Technique: Low-dose CT  acquisition technique included one of following options; 1 . Automated exposure control, 2. Adjustment of MA and or KV according to patient's size or 3. Use of iterative reconstruction. INDICATION: Hematochezia  COMPARISON: 6/10/2014 FINDINGS: Examination is somewhat limited due to patient's clinical condition. The lung bases demonstrate atelectasis. The liver is of normal architecture. There is progressive biliary dilatation with the common bile duct measuring 1.6 cm. Previous cholecystectomy is noted. Spleen, pancreas, the adrenals and the kidneys are normal. Multiple compression fractures are identified in the thoracolumbar spine with previous vertebroplasty. An IVC filter is present. Pelvis. The bladder is contracted with a Henson catheter and wall thickening and haziness. There is diffusely thickened inflamed distal rectosigmoid colon with rectal prolapse. There is presacral edema. There is constipation with fecal impaction.  Radiopaque material is identified in the right hemicolon probably ingested material     Constipation with fecal impaction with  thickening and edema of the rectosigmoid colon concerning for proctitis/colitis with the presacral edema. There may be inflammation extending to involve the urinary bladder resulting cystitis. Progressive biliary dilatation. Xr Chest Abdomen Ng Placement    Result Date: 2020  Patient MRN:  [id number] : 1940 Age: 67 years Gender: Female Order Date:  2020 6:15 PM TECHNIQUE/NUMBER OF IMAGES/COMPARISON/CLINICAL HISTORY: Abdomen supine view 1 image one view NG tube placement. FINDINGS: NG tube in good position the area of the stomach. Pattern of severe constipation. Patient has IVC filter. Multiple levels of vertebroplasty is observed. Surgical clips are seen in the right upper quadrant. Good position for the NG tube. Xr Chest Abdomen Ng Placement    Result Date: 2020  Patient MRN:  [id number] : 1940 Age: 67 years Gender: Female Order Date:  2020 5:30 PM EXAM: XR CHEST ABDOMEN NG PLACEMENT at 1741 hours INDICATION:  portable, NG placement COMPARISON: Portable chest 10/17/2018. CT abdomen and pelvis 2020. FINDINGS:  Single portable AP view centered over the diaphragm. New nasogastric tube has tip in the distal esophagus just above the GE junction, side-port in the distal esophagus. Needs to be repositioned. Chronic mild elevation of the right diaphragm and mild right basilar atelectasis. Left lung base looks grossly clear. Heart size within normal. Old right upper quadrant cholecystectomy clips. Old IVC filter. Old vertebroplasty cement L1, L2, L3. Constipation with a large amount of dense stool distending the transverse colon and splenic flexure. Pelvis not included today. Monitor leads. 1. Nasogastric tube tip in the distal esophagus, needs to be repositioned. 2. Severe constipation. Discharge Exam:    HEENT: NCAT,  PERRLA, No JVD  Heart:  RRR, no murmurs, gallops, or rubs.   Lungs:  CTA bilaterally, no wheeze, rales or rhonchi  Abd: bowel sounds present, nontender, nondistended, no masses  Extrem:  No clubbing, cyanosis, or edema    Disposition: home     Patient Condition at Discharge: stable     Patient Instructions:      Medication List      START taking these medications    senna 8.6 MG tablet  Commonly known as:  SENOKOT  Take 2 tablets by mouth 2 times daily        CHANGE how you take these medications    pantoprazole 40 MG tablet  Commonly known as:  PROTONIX  Take 1 tablet by mouth 2 times daily (before meals)  What changed:  when to take this        CONTINUE taking these medications    allopurinol 300 MG tablet  Commonly known as:  ZYLOPRIM  Take 1 tablet by mouth daily     AZO Cranberry 250-30 MG Tabs     busPIRone 7.5 MG tablet  Commonly known as:  BUSPAR     furosemide 20 MG tablet  Commonly known as:  LASIX     gabapentin 600 MG tablet  Commonly known as:  NEURONTIN     glimepiride 4 MG tablet  Commonly known as:  AMARYL     insulin glargine 100 UNIT/ML injection vial  Commonly known as:  LANTUS     levothyroxine 150 MCG tablet  Commonly known as:  SYNTHROID     Magnesium 500 MG Tabs     oxybutynin 10 MG extended release tablet  Commonly known as:  DITROPAN-XL     pravastatin 40 MG tablet  Commonly known as:  PRAVACHOL     PreserVision AREDS 2 Caps     Probiotic Acidophilus Tabs     SITagliptin 100 MG tablet  Commonly known as:  JANUVIA     sucralfate 1 GM tablet  Commonly known as:  CARAFATE     traZODone 50 MG tablet  Commonly known as:  DESYREL     venlafaxine 150 MG extended release capsule  Commonly known as:  EFFEXOR XR        STOP taking these medications    bumetanide 1 MG tablet  Commonly known as:  BUMEX     Eliquis 5 MG Tabs tablet  Generic drug:  apixaban           Where to Get Your Medications      These medications were sent to [place] [id number] #[id number] - [place] [address] - [id number] [facility] [phone]  [address] [facility] [address]    Phone:  [phone]   · pantoprazole 40 MG tablet  · senna 8.6 MG tablet       Activity: activity as tolerated  Diet: clear liquids, advance as tolerated    Pt has been advised to: Follow-up with No primary care provider on file. in 1 week.   Follow-up with consultants as recommended by them    Note that over 30 minutes was spent in preparing discharge papers, discussing discharge with patient, medication review, etc.    Signed:  Richard Perez MD  7/28/2020  4:16 PM

## 2021-01-01 ENCOUNTER — HOSPITAL ENCOUNTER (EMERGENCY)
Age: 81
Discharge: HOSPICE | End: 2021-03-27
Attending: EMERGENCY MEDICINE
Payer: MEDICARE

## 2021-01-01 ENCOUNTER — APPOINTMENT (OUTPATIENT)
Dept: GENERAL RADIOLOGY | Age: 81
End: 2021-01-01
Payer: MEDICARE

## 2021-01-01 VITALS
SYSTOLIC BLOOD PRESSURE: 131 MMHG | HEART RATE: 97 BPM | TEMPERATURE: 97.1 F | RESPIRATION RATE: 14 BRPM | WEIGHT: 250 LBS | BODY MASS INDEX: 42.68 KG/M2 | HEIGHT: 64 IN | DIASTOLIC BLOOD PRESSURE: 46 MMHG | OXYGEN SATURATION: 100 %

## 2021-01-01 DIAGNOSIS — D64.9 ACUTE ON CHRONIC ANEMIA: ICD-10-CM

## 2021-01-01 DIAGNOSIS — A41.9 SEPTICEMIA (HCC): ICD-10-CM

## 2021-01-01 DIAGNOSIS — Z71.89 DNR (DO NOT RESUSCITATE) DISCUSSION: ICD-10-CM

## 2021-01-01 DIAGNOSIS — Z66 DNR (DO NOT RESUSCITATE): ICD-10-CM

## 2021-01-01 DIAGNOSIS — R41.82 ALTERED MENTAL STATUS, UNSPECIFIED ALTERED MENTAL STATUS TYPE: Primary | ICD-10-CM

## 2021-01-01 DIAGNOSIS — Z79.01 CHRONIC ANTICOAGULATION: ICD-10-CM

## 2021-01-01 DIAGNOSIS — R52 TOTAL BODY PAIN: ICD-10-CM

## 2021-01-01 DIAGNOSIS — N39.0 URINARY TRACT INFECTION WITHOUT HEMATURIA, SITE UNSPECIFIED: ICD-10-CM

## 2021-01-01 DIAGNOSIS — Z51.5 HOSPICE CARE PATIENT: ICD-10-CM

## 2021-01-01 LAB
ABO/RH: NORMAL
ALBUMIN SERPL-MCNC: 2.3 G/DL (ref 3.5–5.2)
ALP BLD-CCNC: 227 U/L (ref 35–104)
ALT SERPL-CCNC: 22 U/L (ref 0–32)
ANION GAP SERPL CALCULATED.3IONS-SCNC: 11 MMOL/L (ref 7–16)
ANISOCYTOSIS: ABNORMAL
ANTIBODY SCREEN: NORMAL
APTT: 28.3 SEC (ref 24.5–35.1)
AST SERPL-CCNC: 61 U/L (ref 0–31)
BACTERIA: ABNORMAL /HPF
BASOPHILS ABSOLUTE: 0 E9/L (ref 0–0.2)
BASOPHILS RELATIVE PERCENT: 0 % (ref 0–2)
BILIRUB SERPL-MCNC: 0.2 MG/DL (ref 0–1.2)
BILIRUBIN URINE: ABNORMAL
BLOOD BANK DISPENSE STATUS: NORMAL
BLOOD BANK PRODUCT CODE: NORMAL
BLOOD CULTURE, ROUTINE: NORMAL
BLOOD, URINE: ABNORMAL
BPU ID: NORMAL
BUN BLDV-MCNC: 48 MG/DL (ref 8–23)
CALCIUM SERPL-MCNC: 7.8 MG/DL (ref 8.6–10.2)
CHLORIDE BLD-SCNC: 101 MMOL/L (ref 98–107)
CHP ED QC CHECK: YES
CLARITY: CLEAR
CO2: 25 MMOL/L (ref 22–29)
COLOR: YELLOW
CREAT SERPL-MCNC: 1.4 MG/DL (ref 0.5–1)
CULTURE, BLOOD 2: NORMAL
DESCRIPTION BLOOD BANK: NORMAL
DR. NOTIFY: NORMAL
EKG ATRIAL RATE: 100 BPM
EKG P AXIS: 23 DEGREES
EKG P-R INTERVAL: 148 MS
EKG Q-T INTERVAL: 366 MS
EKG QRS DURATION: 116 MS
EKG QTC CALCULATION (BAZETT): 472 MS
EKG R AXIS: -49 DEGREES
EKG T AXIS: 112 DEGREES
EKG VENTRICULAR RATE: 100 BPM
EOSINOPHILS ABSOLUTE: 0 E9/L (ref 0.05–0.5)
EOSINOPHILS RELATIVE PERCENT: 0 % (ref 0–6)
GFR AFRICAN AMERICAN: 44
GFR NON-AFRICAN AMERICAN: 36 ML/MIN/1.73
GLUCOSE BLD-MCNC: 145 MG/DL
GLUCOSE BLD-MCNC: 145 MG/DL (ref 74–99)
GLUCOSE URINE: NEGATIVE MG/DL
HCT VFR BLD CALC: 16.4 % (ref 34–48)
HEMOGLOBIN: 4.1 G/DL (ref 11.5–15.5)
HYPOCHROMIA: ABNORMAL
INR BLD: 1.6
KETONES, URINE: NEGATIVE MG/DL
LACTIC ACID, SEPSIS: 1.6 MMOL/L (ref 0.5–1.9)
LEUKOCYTE ESTERASE, URINE: ABNORMAL
LYMPHOCYTES ABSOLUTE: 0.66 E9/L (ref 1.5–4)
LYMPHOCYTES RELATIVE PERCENT: 2.6 % (ref 20–42)
MCH RBC QN AUTO: 16 PG (ref 26–35)
MCHC RBC AUTO-ENTMCNC: 25 % (ref 32–34.5)
MCV RBC AUTO: 64.1 FL (ref 80–99.9)
MONOCYTES ABSOLUTE: 0.66 E9/L (ref 0.1–0.95)
MONOCYTES RELATIVE PERCENT: 3.4 % (ref 2–12)
NEUTROPHILS ABSOLUTE: 20.59 E9/L (ref 1.8–7.3)
NEUTROPHILS RELATIVE PERCENT: 94 % (ref 43–80)
NITRITE, URINE: NEGATIVE
OVALOCYTES: ABNORMAL
PDW BLD-RTO: 16.9 FL (ref 11.5–15)
PH UA: 7 (ref 5–9)
PLATELET # BLD: 397 E9/L (ref 130–450)
PMV BLD AUTO: 10 FL (ref 7–12)
POIKILOCYTES: ABNORMAL
POLYCHROMASIA: ABNORMAL
POTASSIUM REFLEX MAGNESIUM: 3.6 MMOL/L (ref 3.5–5)
PROCALCITONIN: 1.43 NG/ML (ref 0–0.08)
PROTEIN UA: 100 MG/DL
PROTHROMBIN TIME: 17.5 SEC (ref 9.3–12.4)
RBC # BLD: 2.56 E12/L (ref 3.5–5.5)
RBC UA: ABNORMAL /HPF (ref 0–2)
SARS-COV-2, NAAT: NOT DETECTED
SODIUM BLD-SCNC: 137 MMOL/L (ref 132–146)
SPECIFIC GRAVITY UA: 1.02 (ref 1–1.03)
TARGET CELLS: ABNORMAL
TOTAL CK: 123 U/L (ref 20–180)
TOTAL PROTEIN: 5.2 G/DL (ref 6.4–8.3)
TROPONIN: 0.08 NG/ML (ref 0–0.03)
UROBILINOGEN, URINE: 0.2 E.U./DL
WBC # BLD: 21.9 E9/L (ref 4.5–11.5)
WBC UA: ABNORMAL /HPF (ref 0–5)

## 2021-01-01 PROCEDURE — 83605 ASSAY OF LACTIC ACID: CPT

## 2021-01-01 PROCEDURE — 84145 PROCALCITONIN (PCT): CPT

## 2021-01-01 PROCEDURE — 87040 BLOOD CULTURE FOR BACTERIA: CPT

## 2021-01-01 PROCEDURE — 86900 BLOOD TYPING SEROLOGIC ABO: CPT

## 2021-01-01 PROCEDURE — 93010 ELECTROCARDIOGRAM REPORT: CPT | Performed by: INTERNAL MEDICINE

## 2021-01-01 PROCEDURE — 6360000002 HC RX W HCPCS: Performed by: EMERGENCY MEDICINE

## 2021-01-01 PROCEDURE — 86922 COMPATIBILITY TEST ANTIGLOB: CPT

## 2021-01-01 PROCEDURE — 82550 ASSAY OF CK (CPK): CPT

## 2021-01-01 PROCEDURE — 87635 SARS-COV-2 COVID-19 AMP PRB: CPT

## 2021-01-01 PROCEDURE — 93005 ELECTROCARDIOGRAM TRACING: CPT | Performed by: STUDENT IN AN ORGANIZED HEALTH CARE EDUCATION/TRAINING PROGRAM

## 2021-01-01 PROCEDURE — C9113 INJ PANTOPRAZOLE SODIUM, VIA: HCPCS | Performed by: STUDENT IN AN ORGANIZED HEALTH CARE EDUCATION/TRAINING PROGRAM

## 2021-01-01 PROCEDURE — 96375 TX/PRO/DX INJ NEW DRUG ADDON: CPT

## 2021-01-01 PROCEDURE — 85025 COMPLETE CBC W/AUTO DIFF WBC: CPT

## 2021-01-01 PROCEDURE — 2580000003 HC RX 258: Performed by: EMERGENCY MEDICINE

## 2021-01-01 PROCEDURE — 99285 EMERGENCY DEPT VISIT HI MDM: CPT

## 2021-01-01 PROCEDURE — 85610 PROTHROMBIN TIME: CPT

## 2021-01-01 PROCEDURE — 2580000003 HC RX 258: Performed by: STUDENT IN AN ORGANIZED HEALTH CARE EDUCATION/TRAINING PROGRAM

## 2021-01-01 PROCEDURE — 86901 BLOOD TYPING SEROLOGIC RH(D): CPT

## 2021-01-01 PROCEDURE — 81001 URINALYSIS AUTO W/SCOPE: CPT

## 2021-01-01 PROCEDURE — 96366 THER/PROPH/DIAG IV INF ADDON: CPT

## 2021-01-01 PROCEDURE — 85730 THROMBOPLASTIN TIME PARTIAL: CPT

## 2021-01-01 PROCEDURE — 96365 THER/PROPH/DIAG IV INF INIT: CPT

## 2021-01-01 PROCEDURE — P9016 RBC LEUKOCYTES REDUCED: HCPCS

## 2021-01-01 PROCEDURE — 36415 COLL VENOUS BLD VENIPUNCTURE: CPT

## 2021-01-01 PROCEDURE — 80053 COMPREHEN METABOLIC PANEL: CPT

## 2021-01-01 PROCEDURE — 84484 ASSAY OF TROPONIN QUANT: CPT

## 2021-01-01 PROCEDURE — 71045 X-RAY EXAM CHEST 1 VIEW: CPT

## 2021-01-01 PROCEDURE — 6360000002 HC RX W HCPCS: Performed by: STUDENT IN AN ORGANIZED HEALTH CARE EDUCATION/TRAINING PROGRAM

## 2021-01-01 PROCEDURE — 86850 RBC ANTIBODY SCREEN: CPT

## 2021-01-01 PROCEDURE — 36430 TRANSFUSION BLD/BLD COMPNT: CPT

## 2021-01-01 PROCEDURE — 96367 TX/PROPH/DG ADDL SEQ IV INF: CPT

## 2021-01-01 RX ORDER — PANTOPRAZOLE SODIUM 40 MG/1
40 TABLET, DELAYED RELEASE ORAL DAILY
COMMUNITY

## 2021-01-01 RX ORDER — BACLOFEN 5 MG/1
10 TABLET ORAL 2 TIMES DAILY
COMMUNITY

## 2021-01-01 RX ORDER — BUMETANIDE 1 MG/1
1 TABLET ORAL 2 TIMES DAILY
COMMUNITY

## 2021-01-01 RX ORDER — MEMANTINE HYDROCHLORIDE 5 MG/1
5 TABLET ORAL NIGHTLY
COMMUNITY

## 2021-01-01 RX ORDER — 0.9 % SODIUM CHLORIDE 0.9 %
1000 INTRAVENOUS SOLUTION INTRAVENOUS ONCE
Status: COMPLETED | OUTPATIENT
Start: 2021-01-01 | End: 2021-01-01

## 2021-01-01 RX ORDER — SODIUM CHLORIDE 9 MG/ML
INJECTION, SOLUTION INTRAVENOUS PRN
Status: DISCONTINUED | OUTPATIENT
Start: 2021-01-01 | End: 2021-01-01 | Stop reason: HOSPADM

## 2021-01-01 RX ORDER — FENTANYL CITRATE 50 UG/ML
50 INJECTION, SOLUTION INTRAMUSCULAR; INTRAVENOUS ONCE
Status: COMPLETED | OUTPATIENT
Start: 2021-01-01 | End: 2021-01-01

## 2021-01-01 RX ORDER — CEFDINIR 300 MG/1
300 CAPSULE ORAL 2 TIMES DAILY
Qty: 14 CAPSULE | Refills: 0 | Status: SHIPPED | OUTPATIENT
Start: 2021-01-01 | End: 2021-01-01

## 2021-01-01 RX ORDER — MORPHINE SULFATE 10 MG/5ML
5 SOLUTION ORAL EVERY 4 HOURS PRN
Qty: 50 ML | Refills: 0 | Status: SHIPPED | OUTPATIENT
Start: 2021-01-01 | End: 2021-01-01

## 2021-01-01 RX ADMIN — SODIUM CHLORIDE 1000 ML: 9 INJECTION, SOLUTION INTRAVENOUS at 13:23

## 2021-01-01 RX ADMIN — CEFEPIME HYDROCHLORIDE 2000 MG: 2 INJECTION, POWDER, FOR SOLUTION INTRAVENOUS at 14:47

## 2021-01-01 RX ADMIN — FENTANYL CITRATE 50 MCG: 50 INJECTION, SOLUTION INTRAMUSCULAR; INTRAVENOUS at 13:23

## 2021-01-01 RX ADMIN — SODIUM CHLORIDE 80 MG: 9 INJECTION, SOLUTION INTRAVENOUS at 13:23

## 2021-01-01 RX ADMIN — VANCOMYCIN HYDROCHLORIDE 1500 MG: 10 INJECTION, POWDER, LYOPHILIZED, FOR SOLUTION INTRAVENOUS at 17:58

## 2021-01-01 ASSESSMENT — PAIN SCALES - GENERAL
PAINLEVEL_OUTOF10: 6
PAINLEVEL_OUTOF10: 5

## 2021-01-01 ASSESSMENT — PAIN DESCRIPTION - DESCRIPTORS: DESCRIPTORS: ACHING

## 2021-03-26 NOTE — ED NOTES
Ambar Donald at bedside. Call when transfusion and antibiotic complete. Will call [facility] for pt upon discharge home.       Renita Rand RN  03/26/21 [id number]

## 2021-03-26 NOTE — CARE COORDINATION
Social Work/ Discharge Planning:    Pt presents to the ED secondary to altered mental status. Pt is from home. Pt is a DNRCC. SW received call from Gaby Rueda with [facility], who reports pt is active with their palliative care and was to transition to hospice today. ED physician and SW met with pt's . Pt was sleeping. Pt's  reports plan will be for pt to return home with hospice care through [facility]. Pt's  spoke with Gaby Rueda from [facility] and all questions answered. SW to contact Gaby Rueda with [facility] upon discharge.

## 2021-03-26 NOTE — ED NOTES
[facility] to be here to transport pt in approx 2 hours per Imagene Sayer. Will outsource if time will be longer.      Ej Barrios RN  03/26/21 [id number]

## 2021-03-26 NOTE — ED PROVIDER NOTES
[address]      Pt Name: Letty Farnsworth  MRN: [id number]  [place] 1940  Date of evaluation: 3/26/2021      CHIEF COMPLAINT       Chief Complaint   Patient presents with    Altered Mental Status     found at home less responsive than normal, has open wounds on back side. HPI  Letty Farnsworth is a 79 y.o. female with past medical history of diabetes, DVTs on Eliquis, chronic back pain for the last 2 weeks not on antibiotics presents with altered mental status from home. Patient's  states that she woke up this morning lethargic than usual.  Last known well was yesterday evening at 9 PM.  History unable to obtain from patient due to her mental status. Review of systems unable be obtained due to her mental status. Except as noted above the remainder of the review of systems was reviewed and negative. Review of Systems   Unable to perform ROS: Mental status change        Physical Exam  Vitals signs and nursing note reviewed. Constitutional:       General: She is not in acute distress. Appearance: She is well-developed. She is obese. She is ill-appearing. She is not diaphoretic. HENT:      Head: Normocephalic and atraumatic. Eyes:      Extraocular Movements: Extraocular movements intact. Pupils: Pupils are equal, round, and reactive to light. Comments: Conjunctival pallor   Neck:      Musculoskeletal: Normal range of motion. No neck rigidity. Cardiovascular:      Rate and Rhythm: Normal rate and regular rhythm. Heart sounds: Normal heart sounds. No murmur. No friction rub. No gallop. Pulmonary:      Effort: Pulmonary effort is normal. No respiratory distress. Breath sounds: Normal breath sounds. No stridor. No wheezing, rhonchi or rales. Chest:      Chest wall: No tenderness. Abdominal:      General: Bowel sounds are normal.      Palpations: Abdomen is soft. Tenderness: There is no abdominal tenderness. There is no guarding or rebound. Comments: Chaperone present with rectal exam.  Stool light brown. Guaiac positive. No fissures. A nonbleeding hemorrhoid at the 7 o'clock position. No perirectal abscess. Musculoskeletal: Normal range of motion. General: No swelling or tenderness. Skin:     General: Skin is warm and dry. Capillary Refill: Capillary refill takes less than 2 seconds. Comments: 4 x 5 cm decubitus ulcer on the left sacral region. Neurological:      Mental Status: She is lethargic. GCS: GCS eye subscore is 4. GCS verbal subscore is 5. GCS motor subscore is 6. Cranial Nerves: No cranial nerve deficit. Sensory: No sensory deficit. Coordination: Coordination normal.   Psychiatric:         Mood and Affect: Mood normal.          Procedures     MDM  Number of Diagnoses or Management Options  Acute on chronic anemia  Altered mental status, unspecified altered mental status type  Chronic anticoagulation  DNR (do not resuscitate) discussion  DNR (do not resuscitate)  Hospice care patient  Septicemia ([facility]  Total body pain  Urinary tract infection without hematuria, site unspecified  Diagnosis management comments: 69-year-old female presents with altered mental status at home. Hospice was consulted for patient yesterday. On exam patient is lethargic with conjunctival pallor, and guaiac positive. Spoke with patient's  who is a physician. He states that the patient is DNR CCA and is going to hospice care. Diagnostic labs and imaging reviewed. Patient has anemia and a urinary tract infection. She was given 1 unit of PRBCs and loaded on broad-spectrum antibiotics in the department. Patient's  would like his wife brought back home and does not want her admitted to the hospital as she is DNR CC. She will be following with hospice care at home.   Patient has been discharged after 1 unit of PRBCs and 1 round of IV antibiotics. ED Course as of Mar 27 0604   Fri Mar 26, 2021   1210 Patient's  at bedside. He is a physician. States that patient is on DNR CC.    [JV]   1239 Chaperone was present at bedside. Rectal exam has a nonbleeding hemorrhoid at the 7 o'clock position the anus. No perirectal abscess. No fissures. Stool light brown. Guaiac positive. Patient has a 4 cm x 4 cm decubitus ulcer on patient's right sacral region. [JV]   1425 Patient's hemoglobin 4.1. PRBCs have been ordered. Septic work-up filled. Patient will be receiving cefepime and vancomycin. Hemoglobin Quant(!!): 4.1 [JV]   1707 Patient is currently getting 1 unit of PRBCs. She is lying in bed comfortably. [JV]   2018 Patient received a unit of PRBCs. She is discharged to hospice. [JV]      ED Course User Index  [JV] Prasad Mcclellan MD       --------------------------------------------- PAST HISTORY ---------------------------------------------  Past Medical History:  has a past medical history of Diabetes mellitus ([address] DVT (deep venous thrombosis) ([facility] GERD (gastroesophageal reflux disease), Herniated disc, History of DVT (deep vein thrombosis), Hyperlipidemia, Hypertension, Kidney calculi, Left leg DVT ([address] Osteoarthritis, PE (pulmonary embolism), S/P carotid endarterectomy, S/P IVC filter, Sepsis, unspecified, and Thyroid disease. Past Surgical History:  has a past surgical history that includes  section; Cholecystectomy; Salpingo-oophorectomy; Kyphosis surgery (2013); Ureter stent placement (Bilateral, ); Breast biopsy (Left); Tonsillectomy; Appendectomy; Endoscopy, colon, diagnostic; Hysterectomy; back surgery; Upper gastrointestinal endoscopy (N/A, 2020); Colonoscopy (N/A, 2020); and Colonoscopy (N/A, 2020). Social History:  reports that she has never smoked.  She has never used smokeless tobacco. She reports that she does not drink alcohol or use drugs. Family History: family history includes Cancer in her father. The patients home medications have been reviewed.     Allergies: Ciprofloxacin, Codeine, Nitorfurantoin macrocrystalline [nitrofurantoin], Pcn [penicillins], Sulfa antibiotics, and Torsemide    -------------------------------------------------- RESULTS -------------------------------------------------  Labs:  Results for orders placed or performed during the hospital encounter of 03/26/21   COVID-19, Rapid    Specimen: Nares   Result Value Ref Range    SARS-CoV-2, NAAT Not Detected Not Detected   CBC Auto Differential   Result Value Ref Range    WBC 21.9 (H) 4.5 - 11.5 E9/L    RBC 2.56 (L) 3.50 - 5.50 E12/L    Hemoglobin 4.1 (LL) 11.5 - 15.5 g/dL    Hematocrit 16.4 (L) 34.0 - 48.0 %    MCV 64.1 (L) 80.0 - 99.9 fL    MCH 16.0 (L) 26.0 - 35.0 pg    MCHC 25.0 (L) 32.0 - 34.5 %    RDW 16.9 (H) 11.5 - 15.0 fL    Platelets 843 128 - 960 E9/L    MPV 10.0 7.0 - 12.0 fL    Neutrophils % 94.0 (H) 43.0 - 80.0 %    Lymphocytes % 2.6 (L) 20.0 - 42.0 %    Monocytes % 3.4 2.0 - 12.0 %    Eosinophils % 0.0 0.0 - 6.0 %    Basophils % 0.0 0.0 - 2.0 %    Neutrophils Absolute 20.59 (H) 1.80 - 7.30 E9/L    Lymphocytes Absolute 0.66 (L) 1.50 - 4.00 E9/L    Monocytes Absolute 0.66 0.10 - 0.95 E9/L    Eosinophils Absolute 0.00 (L) 0.05 - 0.50 E9/L    Basophils Absolute 0.00 0.00 - 0.20 E9/L    Anisocytosis 2+     Polychromasia 2+     Hypochromia 3+     Poikilocytes 1+     Ovalocytes 1+     Target Cells 1+    Comprehensive Metabolic Panel w/ Reflex to MG   Result Value Ref Range    Sodium 137 132 - 146 mmol/L    Potassium reflex Magnesium 3.6 3.5 - 5.0 mmol/L    Chloride 101 98 - 107 mmol/L    CO2 25 22 - 29 mmol/L    Anion Gap 11 7 - 16 mmol/L    Glucose 145 (H) 74 - 99 mg/dL    BUN 48 (H) 8 - 23 mg/dL    CREATININE 1.4 (H) 0.5 - 1.0 mg/dL    GFR Non-African American 36 >=60 mL/min/1.73    GFR African American 44     Calcium 7.8 (L) 8.6 - 10.2 mg/dL    Total Protein 5.2 (L) 6.4 - 8.3 g/dL    Albumin 2.3 (L) 3.5 - 5.2 g/dL    Total Bilirubin 0.2 0.0 - 1.2 mg/dL    Alkaline Phosphatase 227 (H) 35 - 104 U/L    ALT 22 0 - 32 U/L    AST 61 (H) 0 - 31 U/L   Troponin   Result Value Ref Range    Troponin 0.08 (H) 0.00 - 0.03 ng/mL   Protime-INR   Result Value Ref Range    Protime 17.5 (H) 9.3 - 12.4 sec    INR 1.6    APTT   Result Value Ref Range    aPTT 28.3 24.5 - 35.1 sec   Urinalysis, reflex to microscopic   Result Value Ref Range    Color, UA Yellow Straw/Yellow    Clarity, UA Clear Clear    Glucose, Ur Negative Negative mg/dL    Bilirubin Urine SMALL (A) Negative    Ketones, Urine Negative Negative mg/dL    Specific Gravity, UA 1.020 1.005 - 1.030    Blood, Urine LARGE (A) Negative    pH, UA 7.0 5.0 - 9.0    Protein,  (A) Negative mg/dL    Urobilinogen, Urine 0.2 <2.0 E.U./dL    Nitrite, Urine Negative Negative    Leukocyte Esterase, Urine LARGE (A) Negative   Lactate, Sepsis   Result Value Ref Range    Lactic Acid, Sepsis 1.6 0.5 - 1.9 mmol/L   CK   Result Value Ref Range    Total  20 - 180 U/L   Procalcitonin   Result Value Ref Range    Procalcitonin 1.43 (H) 0.00 - 0.08 ng/mL   Doctor Notification   Result Value Ref Range    DR. TATE COMPL    Microscopic Urinalysis   Result Value Ref Range    WBC, UA PACKED (A) 0 - 5 /HPF    RBC, UA PACKED 0 - 2 /HPF    Bacteria, UA MANY (A) None Seen /HPF   POCT Glucose   Result Value Ref Range    Glucose 145 mg/dL    QC OK?  yes    EKG 12 Lead   Result Value Ref Range    Ventricular Rate 100 BPM    Atrial Rate 100 BPM    P-R Interval 148 ms    QRS Duration 116 ms    Q-T Interval 366 ms    QTc Calculation (Bazett) 472 ms    P Axis 23 degrees    R Axis -49 degrees    T Axis 112 degrees   TYPE AND SCREEN   Result Value Ref Range    ABO/Rh B POS     Antibody Screen NEG    Red Blood Cells Leukoreduced   Result Value Ref Range    Product Code Blood Bank CANCELED     Description Blood Bank CANCELED     Unit Number CANCELED Dispense Status Blood Bank CANCELED     Product Code Blood Bank [id number]     Description Blood Bank Red Blood Cells, Leuko-reduced     Unit Number [id number]     Dispense Status Blood Bank transfused     Product Code Blood Bank CANCELED     Description Blood Bank CANCELED     Unit Number CANCELED     Dispense Status Blood Bank CANCELED     Product Code Blood Bank [id number]     Description Blood Bank Red Blood Cells, Leuko-reduced     Unit Number [id number]     Dispense Status Blood Bank released      EKG: This EKG is signed and interpreted by me. Rate: 100. Normal sinus rhythm with PVCs. With a left anterior fascicular block left axis deviation. No acute formication. No ST elevations or depressions. Change from previous EKG on 10/17/2018. Radiology:  XR CHEST PORTABLE   Final Result   Bilateral airspace opacity may represents pulmonary edema, or multifocal   pneumonia in the proper clinical setting.             ------------------------- NURSING NOTES AND VITALS REVIEWED ---------------------------  Date / Time Roomed:  3/26/2021 11:49 AM  ED Bed Assignment:  20/20    The nursing notes within the ED encounter and vital signs as below have been reviewed. BP (!) 131/46   Pulse 97   Temp 97.1 °F (36.2 °C)   Resp 14   Ht 5' 4\" (1.626 m)   Wt 250 lb (113.4 kg)   SpO2 100%   BMI 42.91 kg/m²   Oxygen Saturation Interpretation: Normal      ------------------------------------------ PROGRESS NOTES ------------------------------------------  6:04 AM EDT  I have spoken with the [facility] care and discussed todays results, in addition to providing specific details for the plan of care and counseling regarding the diagnosis and prognosis. Their questions are answered at this time and they are agreeable with the plan. I discussed at length with them reasons for immediate return here for re evaluation.  They will followup with their Hospice care by calling their office

## (undated) DEVICE — VALVE SUCTION AIR H2O SET ORCA POD + DISP

## (undated) DEVICE — CANNULA NSL ORAL AD FOR CAPNOFLEX CO2 O2 AIRLFE

## (undated) DEVICE — BITEBLOCK 54FR W/ DENT RIM BLOX

## (undated) DEVICE — CONNECTOR TBNG AUX H2O JET DISP FOR OLY 160/180 SER

## (undated) DEVICE — GAUZE,SPONGE,POST-OP,4X3,STRL,LF: Brand: MEDLINE